# Patient Record
Sex: FEMALE | Race: OTHER | HISPANIC OR LATINO | Employment: FULL TIME | URBAN - METROPOLITAN AREA
[De-identification: names, ages, dates, MRNs, and addresses within clinical notes are randomized per-mention and may not be internally consistent; named-entity substitution may affect disease eponyms.]

---

## 2021-05-03 ENCOUNTER — OFFICE VISIT (OUTPATIENT)
Dept: MEDICAL GROUP | Facility: IMAGING CENTER | Age: 21
End: 2021-05-03
Payer: COMMERCIAL

## 2021-05-03 VITALS
DIASTOLIC BLOOD PRESSURE: 80 MMHG | WEIGHT: 135 LBS | OXYGEN SATURATION: 98 % | RESPIRATION RATE: 14 BRPM | SYSTOLIC BLOOD PRESSURE: 122 MMHG | BODY MASS INDEX: 25.49 KG/M2 | HEART RATE: 78 BPM | TEMPERATURE: 97.2 F | HEIGHT: 61 IN

## 2021-05-03 DIAGNOSIS — Z76.89 ENCOUNTER TO ESTABLISH CARE: ICD-10-CM

## 2021-05-03 DIAGNOSIS — Z3A.01 LESS THAN 8 WEEKS GESTATION OF PREGNANCY: ICD-10-CM

## 2021-05-03 PROCEDURE — 99203 OFFICE O/P NEW LOW 30 MIN: CPT | Performed by: PHYSICIAN ASSISTANT

## 2021-05-03 ASSESSMENT — PATIENT HEALTH QUESTIONNAIRE - PHQ9: CLINICAL INTERPRETATION OF PHQ2 SCORE: 0

## 2021-05-03 NOTE — PROGRESS NOTES
Subjective:     CC:    Chief Complaint   Patient presents with   • Establish Care   • Pregnancy     bleeding in the begining        HISTORY OF THE PRESENT ILLNESS: Patient is a 20 y.o. female here to establish care and discuss pregnancy..    Less than 8 weeks gestation of pregnancy  Patient presents with her boyfriend.  Patient states her last menstrual period was 3/17/2021.  She did a pregnancy test 1 month ago which was positive.  She did have intermittent episodes of scant bleeding during intercourse.  Happen around 6 times but has since resolved.  No history of pelvic pain.  She does admit to breast tenderness.  She has had 3 pregnancies in the past 2 with live births via vaginal delivery.  No  complications during that time.  She is currently on a prenatal vitamin.  Patient states that she has had the varicella vaccine.  She has new to [place] from [place] and does not have her previous medical records with her.  Patient thinks her last Tdap was 2 years ago.    Depression Screening    Little interest or pleasure in doing things?  0 - not at all  Feeling down, depressed , or hopeless? 0 - not at all  Patient Health Questionnaire Score: 0      Allergies:   Patient has no known allergies.  Current Outpatient Medications Ordered in Epic   Medication Sig Dispense Refill   • Prenatal Vit-Fe Fumarate-FA (PRENATAL MULTIVITAMIN/IRON PO) Take  by mouth.       No current Epic-ordered facility-administered medications on file.     No past medical history on file.  No past surgical history on file.  Social History     Tobacco Use   • Smoking status: Never Smoker   • Smokeless tobacco: Never Used   Substance Use Topics   • Alcohol use: Not Currently   • Drug use: Never     Social History     Social History Narrative   • Not on file     Family History   Problem Relation Age of Onset   • Cancer Neg Hx    • Heart Disease Neg Hx    • Diabetes Neg Hx        Ob-Gyn/ History:    Patient has GYN provider: no, previously  "in [place]  /Para: 3/2 - no history of gestational diabetes, no pre-eclampsia  No LMP recorded. Patient is pregnant.  Last pap smear: over 3 years ago, has had two negative pap smears  History of abnormal pap smears: No  Current Contraceptive Method: None  Currently sexually active: Yes with boyfriend  H/O STD: No  Periods regular  Cramping is minimal     ROS:   Gen: no fevers/chills, no changes in weight  Eyes: no changes in vision  ENT: no sore throat, no hearing loss, no bloody nose  Pulm: no sob, no cough  CV: no chest pain, no palpitations  GI: no nausea/vomiting, no diarrhea  : no dysuria or hematuria  MSk: no myalgias  Skin: no rash  Neuro: no headaches, no numbness/tingling  Heme/Lymph: no easy bruising      Objective:     Exam: /80 (BP Location: Left arm, Patient Position: Sitting, BP Cuff Size: Adult)   Pulse 78   Temp 36.2 °C (97.2 °F) (Temporal)   Resp 14   Ht 1.549 m (5' 1\")   Wt 61.2 kg (135 lb)   SpO2 98%  Body mass index is 25.51 kg/m².  General: Normal appearing. No distress.  HEENT: Normocephalic. Eyes conjunctiva clear lids without ptosis, pupils equal   Neck: Supple without JVD or bruit. Thyroid is not enlarged.  Pulmonary: Clear to ausculation.  Normal effort. No rales, ronchi, or wheezing.  Cardiovascular: Regular rate and rhythm without murmur. Carotid and radial pulses are intact and equal bilaterally.  Abdomen: Soft, nontender, nondistended. Normal bowel sounds. Liver and spleen are not palpable  Neurologic: Grossly nonfocal  Skin: Warm and dry.  No obvious lesions.  Musculoskeletal: Normal gait. No extremity cyanosis, clubbing, or edema.  Psych: Normal mood and affect. Alert and oriented x3. Judgment and insight is normal.    Assessment & Plan:   20 y.o. female with the following -    1. Encounter to establish care  Pleasant 20-year-old female here to establish care and discuss pregnancy.  Patient admits to history of varicella vaccine as well as recent Tdap.    2. " Less than 8 weeks gestation of pregnancy  Last menstrual period 3/17/2021.  Refer to ob/gyn for further management.  See lab orders below. RTC if bleeding recurs. Pap with gyn.  - US-OB 1ST TRIMESTER SINGLE GEST; Future  - REFERRAL TO OB/GYN  - PREG CNTR PRENATAL PN; Future  - HEP C VIRUS ANTIBODY; Future  - TSH WITH REFLEX TO FT4; Future  - HCG QUANTITATIVE; Future    Return if symptoms worsen or fail to improve.    Aurora Russell PA-C ([place])  Physician Assistant Certified  [facility]    Please note that this dictation was created using voice recognition software. I have made every reasonable attempt to correct obvious errors, but I expect that there are errors of grammar and possibly content that I did not discover before finalizing the note.

## 2021-05-03 NOTE — ASSESSMENT & PLAN NOTE
Patient presents with her boyfriend.  Patient states her last menstrual period was 3/17/2021.  She did a pregnancy test 1 month ago which was positive.  She did have intermittent episodes of scant bleeding during intercourse.  Happen around 6 times but has since resolved.  No history of pelvic pain.  She does admit to breast tenderness.  She has had 3 pregnancies in the past 2 with live births via vaginal delivery.  No  complications during that time.  She is currently on a prenatal vitamin.  Patient states that she has had the varicella vaccine.  She has new to [place] from [place] and does not have her previous medical records with her.  Patient thinks her last Tdap was 2 years ago.

## 2021-05-05 LAB
HIV 1+2 AB+HIV1 P24 AG SERPL QL IA: NON REACTIVE
TREPONEMA PALLIDUM IGG+IGM AB [PRESENCE] IN SERUM OR PLASMA BY IMMUNOASSAY: NON REACTIVE

## 2021-05-06 LAB
ABO GROUP BLD: NORMAL
BASOPHILS # BLD AUTO: 0.1 X10E3/UL (ref 0–0.2)
BASOPHILS NFR BLD AUTO: 1 %
BLD GP AB SCN SERPL QL: NEGATIVE
EOSINOPHIL # BLD AUTO: 0.1 X10E3/UL (ref 0–0.4)
EOSINOPHIL NFR BLD AUTO: 1 %
ERYTHROCYTE [DISTWIDTH] IN BLOOD BY AUTOMATED COUNT: 12 % (ref 11.7–15.4)
HBV SURFACE AG SERPL QL IA: NEGATIVE
HCG INTACT+B SERPL-ACNC: NORMAL MIU/ML
HCT VFR BLD AUTO: 41.4 % (ref 34–46.6)
HCV AB S/CO SERPL IA: 0.2 S/CO RATIO (ref 0–0.9)
HGB BLD-MCNC: 13.9 G/DL (ref 11.1–15.9)
HIV 1+2 AB+HIV1 P24 AG SERPL QL IA: NON REACTIVE
IMM GRANULOCYTES # BLD AUTO: 0 X10E3/UL (ref 0–0.1)
IMM GRANULOCYTES NFR BLD AUTO: 1 %
IMMATURE CELLS  115398: NORMAL
LYMPHOCYTES # BLD AUTO: 1.9 X10E3/UL (ref 0.7–3.1)
LYMPHOCYTES NFR BLD AUTO: 30 %
MCH RBC QN AUTO: 30.7 PG (ref 26.6–33)
MCHC RBC AUTO-ENTMCNC: 33.6 G/DL (ref 31.5–35.7)
MCV RBC AUTO: 91 FL (ref 79–97)
MONOCYTES # BLD AUTO: 0.6 X10E3/UL (ref 0.1–0.9)
MONOCYTES NFR BLD AUTO: 9 %
MORPHOLOGY BLD-IMP: NORMAL
NEUTROPHILS # BLD AUTO: 3.6 X10E3/UL (ref 1.4–7)
NEUTROPHILS NFR BLD AUTO: 58 %
NRBC BLD AUTO-RTO: NORMAL %
PLATELET # BLD AUTO: 258 X10E3/UL (ref 150–450)
RBC # BLD AUTO: 4.53 X10E6/UL (ref 3.77–5.28)
RH BLD: POSITIVE
RPR SER QL: NON REACTIVE
RUBV IGG SERPL IA-ACNC: 1.31 INDEX
TSH SERPL DL<=0.005 MIU/L-ACNC: 1.35 UIU/ML (ref 0.45–4.5)
WBC # BLD AUTO: 6.2 X10E3/UL (ref 3.4–10.8)

## 2021-05-20 ENCOUNTER — HOSPITAL ENCOUNTER (OUTPATIENT)
Dept: RADIOLOGY | Facility: MEDICAL CENTER | Age: 21
End: 2021-05-20
Attending: PHYSICIAN ASSISTANT
Payer: COMMERCIAL

## 2021-05-20 DIAGNOSIS — Z3A.01 LESS THAN 8 WEEKS GESTATION OF PREGNANCY: ICD-10-CM

## 2021-05-20 PROCEDURE — 76817 TRANSVAGINAL US OBSTETRIC: CPT

## 2021-06-07 ENCOUNTER — OFFICE VISIT (OUTPATIENT)
Dept: URGENT CARE | Facility: PHYSICIAN GROUP | Age: 21
End: 2021-06-07
Payer: COMMERCIAL

## 2021-06-07 VITALS
HEIGHT: 62 IN | BODY MASS INDEX: 25.76 KG/M2 | DIASTOLIC BLOOD PRESSURE: 58 MMHG | SYSTOLIC BLOOD PRESSURE: 102 MMHG | WEIGHT: 140 LBS | TEMPERATURE: 98.6 F | RESPIRATION RATE: 16 BRPM | OXYGEN SATURATION: 99 % | HEART RATE: 86 BPM

## 2021-06-07 DIAGNOSIS — R11.0 NAUSEA: ICD-10-CM

## 2021-06-07 DIAGNOSIS — M54.50 ACUTE RIGHT-SIDED LOW BACK PAIN WITHOUT SCIATICA: ICD-10-CM

## 2021-06-07 DIAGNOSIS — Z87.59 HISTORY OF MISCARRIAGE: ICD-10-CM

## 2021-06-07 DIAGNOSIS — R82.90 ABNORMAL URINALYSIS: ICD-10-CM

## 2021-06-07 DIAGNOSIS — Z3A.12 12 WEEKS GESTATION OF PREGNANCY: ICD-10-CM

## 2021-06-07 PROCEDURE — 99214 OFFICE O/P EST MOD 30 MIN: CPT | Performed by: FAMILY MEDICINE

## 2021-06-07 RX ORDER — CEFDINIR 300 MG/1
300 CAPSULE ORAL 2 TIMES DAILY
Qty: 10 CAPSULE | Refills: 0 | Status: SHIPPED | OUTPATIENT
Start: 2021-06-07 | End: 2021-06-12

## 2021-06-07 RX ORDER — DOXYLAMINE SUCCINATE AND PYRIDOXINE HYDROCHLORIDE, DELAYED RELEASE TABLETS 10 MG/10 MG 10; 10 MG/1; MG/1
TABLET, DELAYED RELEASE ORAL
Qty: 60 TABLET | Refills: 0 | Status: SHIPPED | OUTPATIENT
Start: 2021-06-07 | End: 2021-07-30

## 2021-06-07 ASSESSMENT — ENCOUNTER SYMPTOMS
EYE DISCHARGE: 0
MYALGIAS: 0
WEIGHT LOSS: 0
EYE REDNESS: 0

## 2021-06-07 ASSESSMENT — PAIN SCALES - GENERAL: PAINLEVEL: 4=SLIGHT-MODERATE PAIN

## 2021-06-07 NOTE — LETTER
June 7, 2021         Patient: Jan Montes De Oca   YOB: 2000   Date of Visit: 6/7/2021           To Whom it May Concern:    Jan Montes De Oca was seen in my clinic on 6/7/2021 with Alysha Hernandez. Please excuse Cody 6/6 and 6/7/2021.    Sincerely,           Keshav Simmons M.D.  Electronically Signed

## 2021-06-07 NOTE — PROGRESS NOTES
"Subjective:      Jan Montes De Oca is a 21 y.o. female who presents with Back Pain (Lower back pain pt is 3 months pregnant, about 5 months ago pt had a miscarriage)            1 day right low back pain.  4 out of 10 severity.  Not positional.  She is 12 weeks pregnant and pain feels the same as when she had a previous miscarriage.  No vaginal bleeding or loss of fluid.  She has been nauseated with this pregnancy.  She notes a previous ultrasound that showed intrauterine and no ectopic.  No dysuria.  No hematuria.  No fever.  No other aggravating or alleviating factors.      Review of Systems   Constitutional: Negative for malaise/fatigue and weight loss.   Eyes: Negative for discharge and redness.   Musculoskeletal: Negative for joint pain and myalgias.   Skin: Negative for itching and rash.          Objective:     /58   Pulse 86   Temp 37 °C (98.6 °F) (Temporal)   Resp 16   Ht 1.575 m (5' 2\")   Wt 63.5 kg (140 lb)   SpO2 99%   BMI 25.61 kg/m²      Physical Exam  Constitutional:       General: She is not in acute distress.     Appearance: She is well-developed.   HENT:      Head: Normocephalic and atraumatic.   Eyes:      Conjunctiva/sclera: Conjunctivae normal.   Cardiovascular:      Rate and Rhythm: Normal rate and regular rhythm.      Heart sounds: Normal heart sounds. No murmur heard.     Pulmonary:      Effort: Pulmonary effort is normal.      Breath sounds: Normal breath sounds. No wheezing.   Abdominal:      Palpations: Abdomen is soft.      Comments: No suprapubic tenderness   Musculoskeletal:        Back:    Skin:     General: Skin is warm and dry.      Findings: No rash.   Neurological:      Mental Status: She is alert and oriented to person, place, and time.                        Assessment/Plan:   UA reviewed +nitrite    1. Acute right-sided low back pain without sciatica  US-OB TRANSVAGINAL ONLY    cefdinir (OMNICEF) 300 MG Cap    URINE CULTURE(NEW)   2. 12 weeks gestation of pregnancy  US-OB " TRANSVAGINAL ONLY    cefdinir (OMNICEF) 300 MG Cap    URINE CULTURE(NEW)   3. History of miscarriage  US-OB TRANSVAGINAL ONLY   4. Abnormal urinalysis  cefdinir (OMNICEF) 300 MG Cap    URINE CULTURE(NEW)   5. Nausea  Doxylamine-Pyridoxine 10-10 MG Tablet Delayed Response delayed-release tablet     Differential diagnosis, natural history, supportive care, and indications for immediate follow-up discussed at length.     We are unable to get ultrasound due to [employer] insurance.  She will seek ultrasound to [facility] or go to the emergency department.  I will initiate antibiotic and follow-up with urine culture.

## 2021-06-10 LAB
BACTERIA UR CULT: ABNORMAL
BACTERIA UR CULT: ABNORMAL
OTHER ANTIBIOTIC SUSC ISLT: ABNORMAL

## 2021-07-30 ENCOUNTER — APPOINTMENT (OUTPATIENT)
Dept: OBGYN | Facility: CLINIC | Age: 21
End: 2021-07-30
Payer: COMMERCIAL

## 2021-07-30 ENCOUNTER — HOSPITAL ENCOUNTER (OUTPATIENT)
Facility: MEDICAL CENTER | Age: 21
End: 2021-07-30
Attending: NURSE PRACTITIONER
Payer: COMMERCIAL

## 2021-07-30 ENCOUNTER — INITIAL PRENATAL (OUTPATIENT)
Dept: OBGYN | Facility: CLINIC | Age: 21
End: 2021-07-30
Payer: COMMERCIAL

## 2021-07-30 VITALS
DIASTOLIC BLOOD PRESSURE: 60 MMHG | SYSTOLIC BLOOD PRESSURE: 110 MMHG | BODY MASS INDEX: 25.41 KG/M2 | HEIGHT: 62 IN | WEIGHT: 138.1 LBS

## 2021-07-30 DIAGNOSIS — O23.42 URINARY TRACT INFECTION IN MOTHER DURING SECOND TRIMESTER OF PREGNANCY: ICD-10-CM

## 2021-07-30 DIAGNOSIS — Z34.82 ENCOUNTER FOR SUPERVISION OF OTHER NORMAL PREGNANCY, SECOND TRIMESTER: Primary | ICD-10-CM

## 2021-07-30 DIAGNOSIS — Z34.82 ENCOUNTER FOR SUPERVISION OF OTHER NORMAL PREGNANCY, SECOND TRIMESTER: ICD-10-CM

## 2021-07-30 PROBLEM — Z3A.01 LESS THAN 8 WEEKS GESTATION OF PREGNANCY: Status: RESOLVED | Noted: 2021-05-03 | Resolved: 2021-07-30

## 2021-07-30 LAB
APPEARANCE UR: CLEAR
BILIRUB UR STRIP-MCNC: NORMAL MG/DL
COLOR UR AUTO: YELLOW
GLUCOSE UR STRIP.AUTO-MCNC: NEGATIVE MG/DL
KETONES UR STRIP.AUTO-MCNC: 40 MG/DL
LEUKOCYTE ESTERASE UR QL STRIP.AUTO: NORMAL
NITRITE UR QL STRIP.AUTO: POSITIVE
PH UR STRIP.AUTO: 7 [PH] (ref 5–8)
PROT UR QL STRIP: NEGATIVE MG/DL
RBC UR QL AUTO: NEGATIVE
SP GR UR STRIP.AUTO: 1.01
UROBILINOGEN UR STRIP-MCNC: NORMAL MG/DL

## 2021-07-30 PROCEDURE — 87591 N.GONORRHOEAE DNA AMP PROB: CPT

## 2021-07-30 PROCEDURE — 81002 URINALYSIS NONAUTO W/O SCOPE: CPT | Performed by: NURSE PRACTITIONER

## 2021-07-30 PROCEDURE — 88175 CYTOPATH C/V AUTO FLUID REDO: CPT

## 2021-07-30 PROCEDURE — 87624 HPV HI-RISK TYP POOLED RSLT: CPT

## 2021-07-30 PROCEDURE — 0500F INITIAL PRENATAL CARE VISIT: CPT | Performed by: NURSE PRACTITIONER

## 2021-07-30 PROCEDURE — 87491 CHLMYD TRACH DNA AMP PROBE: CPT

## 2021-07-30 RX ORDER — NITROFURANTOIN 25; 75 MG/1; MG/1
100 CAPSULE ORAL 2 TIMES DAILY
Qty: 14 CAPSULE | Refills: 0 | Status: SHIPPED | OUTPATIENT
Start: 2021-07-30 | End: 2021-08-06

## 2021-07-30 NOTE — PROGRESS NOTES
CC: New Ob visit     Subjective Ms. Jan Montes De Oca  19w0d by 8w6d US presents for prenatal care. Today is her first prenatal visit at [facility] She denies any contractions/cramping, leakage of fluid, vaginal bleeding. She does feel movement. Pregnancy is unplanned.  Pt is single.  Lives with boyfirend and children.  Presently working in a warehouse.     I have reviewed the patients' medical, surgical, gynecological, obstetrical, social, and family histories, medications and available lab results and pertinent notes are as follows:     OB History    Para Term  AB Living   3 2 2 0 0 2   SAB TAB Ectopic Molar Multiple Live Births   0 0 0 0 0 2       Past Gynecological History:  Denies fibroids, ovarian cysts, abnormal pap smears, STDs. She denies ever having surgery on her cervix, uterus, or ovaries in the past.  Last pap smear was NA    History reviewed. No pertinent past medical history.  Past Surgical History:   Procedure Laterality Date   • CHOLECYSTECTOMY        Social History     Socioeconomic History   • Marital status: Single     Spouse name: Not on file   • Number of children: Not on file   • Years of education: Not on file   • Highest education level: Not on file   Occupational History   • Not on file   Tobacco Use   • Smoking status: Never Smoker   • Smokeless tobacco: Never Used   Vaping Use   • Vaping Use: Never used   Substance and Sexual Activity   • Alcohol use: Not Currently   • Drug use: Never   • Sexual activity: Yes     Partners: Male   Other Topics Concern   • Not on file   Social History Narrative   • Not on file     Social Determinants of Health     Financial Resource Strain:    • Difficulty of Paying Living Expenses:    Food Insecurity:    • Worried About Running Out of Food in the Last Year:    • Ran Out of Food in the Last Year:    Transportation Needs:    • Lack of Transportation (Medical):    • Lack of Transportation (Non-Medical):    Physical Activity:     • Days of Exercise per Week:    • Minutes of Exercise per Session:    Stress:    • Feeling of Stress :    Social Connections:    • Frequency of Communication with Friends and Family:    • Frequency of Social Gatherings with Friends and Family:    • Attends Sikh Services:    • Active Member of Clubs or Organizations:    • Attends Club or Organization Meetings:    • Marital Status:    Intimate Partner Violence:    • Fear of Current or Ex-Partner:    • Emotionally Abused:    • Physically Abused:    • Sexually Abused:      Family History:   Family History   Problem Relation Age of Onset   • Cancer Neg Hx    • Heart Disease Neg Hx    • Diabetes Neg Hx        Genetic Screening/Teratology Counseling- Includes patient, baby's father, or anyone in either family with:  Patient's age 35 years or older as of estimated date of delivery: No   Thalassemia (Italian, Greek, Mediterranean, or  background): MCV less than 80: No   Neural tube defect (Meningomyelocele, Spina bifida, or Anencephaly): No   Congenital heart defect: No   Down syndrome: No   Brett-Sachs (Ashkenazi Voodoo, Cajun, Uzbek Sammarinese): No   Canavan disease (Ashkenazi Voodoo): No   Familial dysautonomia (Ashkenazi Voodoo): No   Sickle cell disease or trait (): No   Hemophilia or other blood disorders: No   Muscular dystrophy: No    Cystic fibrosis: No   Miranda's chorea: No   Mental retardation/autism: No   Other inherited genetic or chromosomal disorder: No   Maternal metabolic disorder (eg. Type 1 diabetes, PKU): No   Patient or baby's father had child with birth defects not listed above: No   Recurrent pregnancy loss, or a stillbirth: No   Medications (including supplements, vitamins, herbs, or OTC drugs)/illicit/recreational drugs/alcohol since last menstrual period: No           Infection Prevention  1. High Risk For HIV: No 6. Rash Or Illness Since LMP: No   2. High Risk For Hepatitis B or C: No 7. History Of STD, GC, Chlamydia, HPV  "Syphilis: No   3. Live With Someone With TB Or Exposed To TB: No 8. Have a cat in the home?: No   4. Patient Or Partner Has A History Of Herpes: No    5. History of Chicken Pox: No    Comments: Unplanned but desired. FOB involved and supportive. Different FOB. 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          Allergies: Patient has no known allergies.  Objective:/60   Ht 1.575 m (5' 2\")   Wt 62.6 kg (138 lb 1.6 oz)      Objective:   Vitals:    07/30/21 0829   BP: 110/60     Prenatal Physical:  General Exam:  HEENT: normal    Heart: normal    Thyroid: normal    Lungs: normal    Lymph Nodes: normal    Breasts: normal    Neurological: normal    Abdomen: normal    Skin: normal    Extremities: normal    Pelvic Exam:  Vulva:  Normal  Vagina:  Normal  Cervix:  Normal  Uterus (wks):  19  Adnexa:  Normal  Rectum:  Not assessed       Lab:   Recent Results (from the past 672 hour(s))   POCT Urinalysis    Collection Time: 07/30/21  8:48 AM   Result Value Ref Range    POC Color Yellow Negative    POC Appearance Clear Negative    POC Leukocyte Esterase Trace Negative    POC Nitrites Positive Negative    POC Urobiligen      POC Protein Negative Negative mg/dL    POC Urine PH 7.0 5.0 - 8.0    POC Blood Negative Negative    POC Specific Gravity 1.015 <1.005 - >1.030    POC Ketones 40 Negative mg/dL    POC Bilirubin      POC Glucose Negative Negative mg/dL       Ultrasound:  Reviewed initial scan and SAIDA is by 8w6d US    Assessment:  --Normal Exam at 19 weeks  Patient Active Problem List   Diagnosis   • Encounter for supervision of other normal pregnancy, second trimester   • Urinary tract infection in mother during second trimester of pregnancy         Plan:  Genetic screening was discussed with literature on Prenatal Screening, Cystic Fibrosis, and SMA provided and the patient declines all testing at this time.  Discuss importance of water intake, healthy diet, eating 5 small meals throughout the day to " maintain blood sugar and taking PNV  If has nausea, take Vitamin B6 25mg TID with doxylamine/Unisom 25mg at night  Discuss importance of exercise, as well as rest.  Pap/GCCT done.  Labs ordered.  Complete OB US 4 wks.  Discuss policy for OB care at [facility]  Follow up in 4 weeks for next visit     Chaperone offered and provided by Malissa Keith MA.

## 2021-07-30 NOTE — PROGRESS NOTES
NOB today  LMP: N/a  Last pap: Today   Phone # [phone]  Pharmacy confirmed  On PNV Yes  Cystic Fibrosis test offered. Declined   C/o   Nausea

## 2021-08-03 LAB
C TRACH DNA GENITAL QL NAA+PROBE: NEGATIVE
CYTOLOGY REG CYTOL: NORMAL
N GONORRHOEA DNA GENITAL QL NAA+PROBE: NEGATIVE
SPECIMEN SOURCE: NORMAL

## 2021-08-05 LAB
HPV HR 12 DNA CVX QL NAA+PROBE: POSITIVE
HPV16 DNA SPEC QL NAA+PROBE: NEGATIVE
HPV18 DNA SPEC QL NAA+PROBE: NEGATIVE
SPECIMEN SOURCE: ABNORMAL

## 2021-08-06 DIAGNOSIS — R87.610 ASCUS WITH POSITIVE HIGH RISK HPV CERVICAL: ICD-10-CM

## 2021-08-06 DIAGNOSIS — R87.810 ASCUS WITH POSITIVE HIGH RISK HPV CERVICAL: ICD-10-CM

## 2021-08-06 DIAGNOSIS — B96.89 BV (BACTERIAL VAGINOSIS): Primary | ICD-10-CM

## 2021-08-06 DIAGNOSIS — N76.0 BV (BACTERIAL VAGINOSIS): Primary | ICD-10-CM

## 2021-08-06 RX ORDER — METRONIDAZOLE 500 MG/1
500 TABLET ORAL EVERY 12 HOURS
Qty: 14 TABLET | Refills: 0 | Status: SHIPPED | OUTPATIENT
Start: 2021-08-06 | End: 2021-08-13

## 2021-08-18 ENCOUNTER — APPOINTMENT (OUTPATIENT)
Dept: RADIOLOGY | Facility: IMAGING CENTER | Age: 21
End: 2021-08-18
Attending: NURSE PRACTITIONER
Payer: COMMERCIAL

## 2021-08-18 DIAGNOSIS — Z34.82 ENCOUNTER FOR SUPERVISION OF OTHER NORMAL PREGNANCY, SECOND TRIMESTER: ICD-10-CM

## 2021-08-18 PROCEDURE — 76805 OB US >/= 14 WKS SNGL FETUS: CPT | Mod: TC | Performed by: NURSE PRACTITIONER

## 2021-08-23 ENCOUNTER — APPOINTMENT (OUTPATIENT)
Dept: RADIOLOGY | Facility: MEDICAL CENTER | Age: 21
DRG: 833 | End: 2021-08-23
Attending: NURSE PRACTITIONER
Payer: COMMERCIAL

## 2021-08-23 ENCOUNTER — HOSPITAL ENCOUNTER (INPATIENT)
Facility: MEDICAL CENTER | Age: 21
LOS: 1 days | DRG: 833 | End: 2021-08-26
Attending: OBSTETRICS & GYNECOLOGY | Admitting: OBSTETRICS & GYNECOLOGY
Payer: COMMERCIAL

## 2021-08-23 LAB
ALBUMIN SERPL BCP-MCNC: 3.2 G/DL (ref 3.2–4.9)
ALBUMIN SERPL BCP-MCNC: 3.5 G/DL (ref 3.2–4.9)
ALBUMIN/GLOB SERPL: 1.3 G/DL
ALBUMIN/GLOB SERPL: 1.4 G/DL
ALP SERPL-CCNC: 78 U/L (ref 30–99)
ALP SERPL-CCNC: 82 U/L (ref 30–99)
ALT SERPL-CCNC: 5 U/L (ref 2–50)
ALT SERPL-CCNC: 6 U/L (ref 2–50)
ANION GAP SERPL CALC-SCNC: 13 MMOL/L (ref 7–16)
ANION GAP SERPL CALC-SCNC: 13 MMOL/L (ref 7–16)
APPEARANCE UR: ABNORMAL
APPEARANCE UR: CLEAR
AST SERPL-CCNC: 14 U/L (ref 12–45)
AST SERPL-CCNC: 15 U/L (ref 12–45)
BACTERIA #/AREA URNS HPF: ABNORMAL /HPF
BASOPHILS # BLD AUTO: 0.1 % (ref 0–1.8)
BASOPHILS # BLD AUTO: 0.2 % (ref 0–1.8)
BASOPHILS # BLD AUTO: 0.2 % (ref 0–1.8)
BASOPHILS # BLD: 0.01 K/UL (ref 0–0.12)
BASOPHILS # BLD: 0.02 K/UL (ref 0–0.12)
BASOPHILS # BLD: 0.03 K/UL (ref 0–0.12)
BILIRUB SERPL-MCNC: 0.4 MG/DL (ref 0.1–1.5)
BILIRUB SERPL-MCNC: 0.5 MG/DL (ref 0.1–1.5)
BILIRUB UR QL STRIP.AUTO: NEGATIVE
BUN SERPL-MCNC: 4 MG/DL (ref 8–22)
BUN SERPL-MCNC: 5 MG/DL (ref 8–22)
CALCIUM SERPL-MCNC: 8.4 MG/DL (ref 8.5–10.5)
CALCIUM SERPL-MCNC: 8.5 MG/DL (ref 8.5–10.5)
CHLORIDE SERPL-SCNC: 104 MMOL/L (ref 96–112)
CHLORIDE SERPL-SCNC: 106 MMOL/L (ref 96–112)
CO2 SERPL-SCNC: 17 MMOL/L (ref 20–33)
CO2 SERPL-SCNC: 20 MMOL/L (ref 20–33)
COLOR UR AUTO: ABNORMAL
COLOR UR: YELLOW
CREAT SERPL-MCNC: 0.28 MG/DL (ref 0.5–1.4)
CREAT SERPL-MCNC: 0.31 MG/DL (ref 0.5–1.4)
EOSINOPHIL # BLD AUTO: 0 K/UL (ref 0–0.51)
EOSINOPHIL # BLD AUTO: 0.01 K/UL (ref 0–0.51)
EOSINOPHIL # BLD AUTO: 0.01 K/UL (ref 0–0.51)
EOSINOPHIL NFR BLD: 0 % (ref 0–6.9)
EOSINOPHIL NFR BLD: 0.1 % (ref 0–6.9)
EOSINOPHIL NFR BLD: 0.1 % (ref 0–6.9)
EPI CELLS #/AREA URNS HPF: NEGATIVE /HPF
ERYTHROCYTE [DISTWIDTH] IN BLOOD BY AUTOMATED COUNT: 43.2 FL (ref 35.9–50)
ERYTHROCYTE [DISTWIDTH] IN BLOOD BY AUTOMATED COUNT: 43.2 FL (ref 35.9–50)
ERYTHROCYTE [DISTWIDTH] IN BLOOD BY AUTOMATED COUNT: 44.1 FL (ref 35.9–50)
GLOBULIN SER CALC-MCNC: 2.3 G/DL (ref 1.9–3.5)
GLOBULIN SER CALC-MCNC: 2.7 G/DL (ref 1.9–3.5)
GLUCOSE SERPL-MCNC: 100 MG/DL (ref 65–99)
GLUCOSE SERPL-MCNC: 90 MG/DL (ref 65–99)
GLUCOSE UR QL STRIP.AUTO: NEGATIVE MG/DL
GLUCOSE UR STRIP.AUTO-MCNC: NEGATIVE MG/DL
HCT VFR BLD AUTO: 31.1 % (ref 37–47)
HCT VFR BLD AUTO: 31.7 % (ref 37–47)
HCT VFR BLD AUTO: 34.8 % (ref 37–47)
HGB BLD-MCNC: 10.7 G/DL (ref 12–16)
HGB BLD-MCNC: 10.8 G/DL (ref 12–16)
HGB BLD-MCNC: 12.2 G/DL (ref 12–16)
HYALINE CASTS #/AREA URNS LPF: ABNORMAL /LPF
IMM GRANULOCYTES # BLD AUTO: 0.07 K/UL (ref 0–0.11)
IMM GRANULOCYTES # BLD AUTO: 0.09 K/UL (ref 0–0.11)
IMM GRANULOCYTES # BLD AUTO: 0.12 K/UL (ref 0–0.11)
IMM GRANULOCYTES NFR BLD AUTO: 0.7 % (ref 0–0.9)
IMM GRANULOCYTES NFR BLD AUTO: 0.7 % (ref 0–0.9)
IMM GRANULOCYTES NFR BLD AUTO: 0.8 % (ref 0–0.9)
KETONES UR QL STRIP.AUTO: ABNORMAL MG/DL
KETONES UR STRIP.AUTO-MCNC: 80 MG/DL
LACTATE BLD-SCNC: 1.1 MMOL/L (ref 0.5–2)
LACTATE BLD-SCNC: 1.3 MMOL/L (ref 0.5–2)
LEUKOCYTE ESTERASE UR QL STRIP.AUTO: ABNORMAL
LEUKOCYTE ESTERASE UR QL STRIP.AUTO: ABNORMAL
LYMPHOCYTES # BLD AUTO: 0.65 K/UL (ref 1–4.8)
LYMPHOCYTES # BLD AUTO: 0.7 K/UL (ref 1–4.8)
LYMPHOCYTES # BLD AUTO: 0.91 K/UL (ref 1–4.8)
LYMPHOCYTES NFR BLD: 5.2 % (ref 22–41)
LYMPHOCYTES NFR BLD: 5.7 % (ref 22–41)
LYMPHOCYTES NFR BLD: 7.3 % (ref 22–41)
MCH RBC QN AUTO: 30.8 PG (ref 27–33)
MCH RBC QN AUTO: 30.9 PG (ref 27–33)
MCH RBC QN AUTO: 31.4 PG (ref 27–33)
MCHC RBC AUTO-ENTMCNC: 34.1 G/DL (ref 33.6–35)
MCHC RBC AUTO-ENTMCNC: 34.4 G/DL (ref 33.6–35)
MCHC RBC AUTO-ENTMCNC: 35.1 G/DL (ref 33.6–35)
MCV RBC AUTO: 89.6 FL (ref 81.4–97.8)
MCV RBC AUTO: 89.7 FL (ref 81.4–97.8)
MCV RBC AUTO: 90.8 FL (ref 81.4–97.8)
MICRO URNS: ABNORMAL
MONOCYTES # BLD AUTO: 0.43 K/UL (ref 0–0.85)
MONOCYTES # BLD AUTO: 0.46 K/UL (ref 0–0.85)
MONOCYTES # BLD AUTO: 0.73 K/UL (ref 0–0.85)
MONOCYTES NFR BLD AUTO: 3.7 % (ref 0–13.4)
MONOCYTES NFR BLD AUTO: 4.5 % (ref 0–13.4)
MONOCYTES NFR BLD AUTO: 4.6 % (ref 0–13.4)
NEUTROPHILS # BLD AUTO: 11.3 K/UL (ref 2–7.15)
NEUTROPHILS # BLD AUTO: 14.13 K/UL (ref 2–7.15)
NEUTROPHILS # BLD AUTO: 8.36 K/UL (ref 2–7.15)
NEUTROPHILS NFR BLD: 87.2 % (ref 44–72)
NEUTROPHILS NFR BLD: 88.6 % (ref 44–72)
NEUTROPHILS NFR BLD: 90.3 % (ref 44–72)
NITRITE UR QL STRIP.AUTO: NEGATIVE
NITRITE UR QL STRIP.AUTO: NEGATIVE
NRBC # BLD AUTO: 0 K/UL
NRBC BLD-RTO: 0 /100 WBC
PH UR STRIP.AUTO: 7 [PH] (ref 5–8)
PH UR STRIP.AUTO: 7 [PH] (ref 5–8)
PLATELET # BLD AUTO: 171 K/UL (ref 164–446)
PLATELET # BLD AUTO: 182 K/UL (ref 164–446)
PLATELET # BLD AUTO: 187 K/UL (ref 164–446)
PMV BLD AUTO: 10.7 FL (ref 9–12.9)
PMV BLD AUTO: 10.8 FL (ref 9–12.9)
PMV BLD AUTO: 11 FL (ref 9–12.9)
POTASSIUM SERPL-SCNC: 3.2 MMOL/L (ref 3.6–5.5)
POTASSIUM SERPL-SCNC: 3.4 MMOL/L (ref 3.6–5.5)
PROT SERPL-MCNC: 5.5 G/DL (ref 6–8.2)
PROT SERPL-MCNC: 6.2 G/DL (ref 6–8.2)
PROT UR QL STRIP: NEGATIVE MG/DL
PROT UR QL STRIP: NEGATIVE MG/DL
RBC # BLD AUTO: 3.47 M/UL (ref 4.2–5.4)
RBC # BLD AUTO: 3.49 M/UL (ref 4.2–5.4)
RBC # BLD AUTO: 3.88 M/UL (ref 4.2–5.4)
RBC # URNS HPF: ABNORMAL /HPF
RBC UR QL AUTO: NEGATIVE
RBC UR QL AUTO: NEGATIVE
SARS-COV+SARS-COV-2 AG RESP QL IA.RAPID: NOTDETECTED
SARS-COV-2 RNA RESP QL NAA+PROBE: NOTDETECTED
SODIUM SERPL-SCNC: 136 MMOL/L (ref 135–145)
SODIUM SERPL-SCNC: 137 MMOL/L (ref 135–145)
SP GR UR STRIP.AUTO: 1.02
SP GR UR STRIP.AUTO: 1.02 (ref 1–1.03)
SPECIMEN SOURCE: NORMAL
SPECIMEN SOURCE: NORMAL
UROBILINOGEN UR STRIP.AUTO-MCNC: 1 MG/DL
WBC # BLD AUTO: 12.5 K/UL (ref 4.8–10.8)
WBC # BLD AUTO: 15.9 K/UL (ref 4.8–10.8)
WBC # BLD AUTO: 9.6 K/UL (ref 4.8–10.8)
WBC #/AREA URNS HPF: ABNORMAL /HPF

## 2021-08-23 PROCEDURE — U0003 INFECTIOUS AGENT DETECTION BY NUCLEIC ACID (DNA OR RNA); SEVERE ACUTE RESPIRATORY SYNDROME CORONAVIRUS 2 (SARS-COV-2) (CORONAVIRUS DISEASE [COVID-19]), AMPLIFIED PROBE TECHNIQUE, MAKING USE OF HIGH THROUGHPUT TECHNOLOGIES AS DESCRIBED BY CMS-2020-01-R: HCPCS

## 2021-08-23 PROCEDURE — 36415 COLL VENOUS BLD VENIPUNCTURE: CPT

## 2021-08-23 PROCEDURE — 76775 US EXAM ABDO BACK WALL LIM: CPT

## 2021-08-23 PROCEDURE — 99218 PR INITIAL OBSERVATION CARE,LEVL I: CPT | Performed by: OBSTETRICS & GYNECOLOGY

## 2021-08-23 PROCEDURE — 302154 KIT COOLING VEST BARIATRIC BLANKET: Performed by: OBSTETRICS & GYNECOLOGY

## 2021-08-23 PROCEDURE — 87086 URINE CULTURE/COLONY COUNT: CPT

## 2021-08-23 PROCEDURE — 87426 SARSCOV CORONAVIRUS AG IA: CPT

## 2021-08-23 PROCEDURE — 302449 STATCHG TRIAGE ONLY (STATISTIC)

## 2021-08-23 PROCEDURE — 302131 K PAD MOTOR: Performed by: OBSTETRICS & GYNECOLOGY

## 2021-08-23 PROCEDURE — A9270 NON-COVERED ITEM OR SERVICE: HCPCS | Performed by: OBSTETRICS & GYNECOLOGY

## 2021-08-23 PROCEDURE — 700102 HCHG RX REV CODE 250 W/ 637 OVERRIDE(OP): Performed by: NURSE PRACTITIONER

## 2021-08-23 PROCEDURE — 85025 COMPLETE CBC W/AUTO DIFF WBC: CPT

## 2021-08-23 PROCEDURE — U0005 INFEC AGEN DETEC AMPLI PROBE: HCPCS

## 2021-08-23 PROCEDURE — 81002 URINALYSIS NONAUTO W/O SCOPE: CPT

## 2021-08-23 PROCEDURE — 76815 OB US LIMITED FETUS(S): CPT

## 2021-08-23 PROCEDURE — 87077 CULTURE AEROBIC IDENTIFY: CPT

## 2021-08-23 PROCEDURE — 700105 HCHG RX REV CODE 258: Performed by: NURSE PRACTITIONER

## 2021-08-23 PROCEDURE — 83605 ASSAY OF LACTIC ACID: CPT

## 2021-08-23 PROCEDURE — 87186 SC STD MICRODIL/AGAR DIL: CPT

## 2021-08-23 PROCEDURE — 700102 HCHG RX REV CODE 250 W/ 637 OVERRIDE(OP): Performed by: OBSTETRICS & GYNECOLOGY

## 2021-08-23 PROCEDURE — G0378 HOSPITAL OBSERVATION PER HR: HCPCS

## 2021-08-23 PROCEDURE — A9270 NON-COVERED ITEM OR SERVICE: HCPCS | Performed by: NURSE PRACTITIONER

## 2021-08-23 PROCEDURE — 700105 HCHG RX REV CODE 258: Performed by: OBSTETRICS & GYNECOLOGY

## 2021-08-23 PROCEDURE — 80053 COMPREHEN METABOLIC PANEL: CPT

## 2021-08-23 PROCEDURE — 700111 HCHG RX REV CODE 636 W/ 250 OVERRIDE (IP): Performed by: NURSE PRACTITIONER

## 2021-08-23 PROCEDURE — 81001 URINALYSIS AUTO W/SCOPE: CPT

## 2021-08-23 RX ORDER — SODIUM CHLORIDE, SODIUM LACTATE, POTASSIUM CHLORIDE, AND CALCIUM CHLORIDE .6; .31; .03; .02 G/100ML; G/100ML; G/100ML; G/100ML
1000 INJECTION, SOLUTION INTRAVENOUS ONCE
Status: COMPLETED | OUTPATIENT
Start: 2021-08-23 | End: 2021-08-23

## 2021-08-23 RX ORDER — SODIUM CHLORIDE, SODIUM LACTATE, POTASSIUM CHLORIDE, CALCIUM CHLORIDE 600; 310; 30; 20 MG/100ML; MG/100ML; MG/100ML; MG/100ML
INJECTION, SOLUTION INTRAVENOUS CONTINUOUS
Status: DISCONTINUED | OUTPATIENT
Start: 2021-08-23 | End: 2021-08-24

## 2021-08-23 RX ORDER — OXYCODONE HYDROCHLORIDE 5 MG/1
5 TABLET ORAL EVERY 4 HOURS PRN
Status: DISCONTINUED | OUTPATIENT
Start: 2021-08-23 | End: 2021-08-26 | Stop reason: HOSPADM

## 2021-08-23 RX ORDER — ACETAMINOPHEN 500 MG
1000 TABLET ORAL EVERY 6 HOURS PRN
Status: DISCONTINUED | OUTPATIENT
Start: 2021-08-23 | End: 2021-08-26 | Stop reason: HOSPADM

## 2021-08-23 RX ADMIN — ACETAMINOPHEN 1000 MG: 500 TABLET ORAL at 03:51

## 2021-08-23 RX ADMIN — CEFTRIAXONE SODIUM 1 G: 1 INJECTION, POWDER, FOR SOLUTION INTRAMUSCULAR; INTRAVENOUS at 06:46

## 2021-08-23 RX ADMIN — SODIUM CHLORIDE, POTASSIUM CHLORIDE, SODIUM LACTATE AND CALCIUM CHLORIDE 1000 ML: 600; 310; 30; 20 INJECTION, SOLUTION INTRAVENOUS at 03:56

## 2021-08-23 RX ADMIN — SODIUM CHLORIDE, POTASSIUM CHLORIDE, SODIUM LACTATE AND CALCIUM CHLORIDE 1000 ML: 600; 310; 30; 20 INJECTION, SOLUTION INTRAVENOUS at 20:28

## 2021-08-23 RX ADMIN — SODIUM CHLORIDE, POTASSIUM CHLORIDE, SODIUM LACTATE AND CALCIUM CHLORIDE: 600; 310; 30; 20 INJECTION, SOLUTION INTRAVENOUS at 21:30

## 2021-08-23 RX ADMIN — OXYCODONE 5 MG: 5 TABLET ORAL at 07:57

## 2021-08-23 RX ADMIN — SODIUM CHLORIDE, POTASSIUM CHLORIDE, SODIUM LACTATE AND CALCIUM CHLORIDE 1000 ML: 600; 310; 30; 20 INJECTION, SOLUTION INTRAVENOUS at 06:43

## 2021-08-23 RX ADMIN — ACETAMINOPHEN 1000 MG: 500 TABLET ORAL at 19:36

## 2021-08-23 RX ADMIN — ACETAMINOPHEN 1000 MG: 500 TABLET ORAL at 13:30

## 2021-08-23 ASSESSMENT — PAIN DESCRIPTION - PAIN TYPE
TYPE: ACUTE PAIN

## 2021-08-23 NOTE — PROGRESS NOTES
0700 - Report received from Merlene PAUL, care assumed. [place] Gestation today at 22.3 weeks    0730 - Patient is in the bed resting quietly with the FOB - Alysha at the BS. The patient is not expecting visitors today. Reports little sleep last night, Alysha brought her food for breakfast, denies nausea. Denies contractions or cramping, denies ill feeling, reports her back pain is better since the tylenol this am (0351) but continues to nag her lower back - discussed pain medications and heat packs. Reports FM, no ROM no Bleeding, denies change to vision/edema/HA; states she has been getting up to the BR herself without assist, denies dizziness or weakness.  Elevated maternal pulse noted, denies report of high pulse in the past.     FHT at 155-160, discussed POC, sleepy/pleasant affect/mood, denies questions/concerns regarding care since arrival to [facility].   Encouraged to continue to drink water and rest.     Patient encouraged to call RN with all questions/concerns/needs. The dry erase board updated with RN/CNA contact information, reviewed.

## 2021-08-23 NOTE — H&P
History and Physical    Jan Montes De Oca is a 21 y.o. female  -Para:     Gestational Age:  22w3d  Admitted for:   Observation  Admitted to  [facility] Labor and Delivery.  [facility]    HPI: Patient is admitted with the above mentioned Chief Complaint and States   Loss of fluid:   negative  Abdominal Pain:  positive  Uterine Contractions:  negative  Vaginal Bleeding:  negative  Fetal Movement:  normal  Patient denies fever, chills, nausea, vomiting , headache, visual disturbance, or dysuria  No LMP recorded. Patient is pregnant.  Estimated Date of Delivery: 21  Final SAIDA: 2021, by Ultrasound    Patient Active Problem List    Diagnosis Date Noted   • ASCUS with positive high risk HPV cervical 2021   • Encounter for supervision of other normal pregnancy, second trimester 2021   • Urinary tract infection in mother during second trimester of pregnancy 2021       Admitting DX: Pregnancy   Pregnancy Complications:  maternal infection--UTI, untreated  OB Risk Factors:   non-compliance  Labor State:    Not in labor.    History:   has no past medical history of Addisons disease ([facility]), Adrenal disorder ([facility]), Allergy, Anemia, Anxiety, Arrhythmia, Arthritis, Asthma, Blood transfusion without reported diagnosis, Cancer ([facility]), Cataract, CHF (congestive heart failure) ([facility]), Clotting disorder ([facility]), COPD (chronic obstructive pulmonary disease) ([facility]), Cushings syndrome ([facility]), Depression, Diabetes ([facility]), Diabetic neuropathy ([facility]), GERD (gastroesophageal reflux disease), Glaucoma, Goiter, Head ache, Heart attack ([facility]), Heart murmur, HIV (human immunodeficiency virus infection) ([facility]), Hyperlipidemia, Hypertension, IBD (inflammatory bowel disease), Kidney disease, Meningitis, Migraine, Muscle disorder, Osteoporosis, Parathyroid disorder ([facility]), Pituitary disease ([facility]), Pulmonary emphysema ([facility]), Seizure ([facility]), Sickle cell disease ([facility]), Stroke ([facility]), Substance  "abuse (HCC), Thyroid disease, Tuberculosis, or Urinary tract infection.     has a past surgical history that includes cholecystectomy.    OB History    Para Term  AB Living   3 2 2 0 0 2   SAB TAB Ectopic Molar Multiple Live Births   0 0 0 0 0 2      # Outcome Date GA Lbr Akbar/2nd Weight Sex Delivery Anes PTL Lv   3 Current            2 Term 18 40w0d  3.175 kg (7 lb) F Vag-Spont None N MAGGIE   1 Term 13 40w0d  3.175 kg (7 lb) M Vag-Spont None N MAGGIE       Medications:  No current facility-administered medications on file prior to encounter.     No current outpatient medications on file prior to encounter.       Allergies:  Patient has no known allergies.    ROS:   Neuro: negative    Cardiovascular: negative  Gastro intestinal: negative  Genitourinary: positive for urinary tract infections            Physical Exam:  /61   Pulse (!) 137   Temp (!) 38.6 °C (101.4 °F) (Oral)   Resp 18   Ht 1.575 m (5' 2\")   Wt 65.8 kg (145 lb)   BMI 26.52 kg/m²   Constitutional: healthy-appearing, Well-developed, well-nourished, in mildly distress  No JVD: while supine  HEENT: EOMI  Breast Exam: Inspection negative. No nipple discharge or bleeding. No masses or nodularity palpable  Cardio: regular rhythm, tachycardia present 115-140 bpm  Lung: unlabored respirations, no intercostal retractions or accessory muscle use, clear to auscultation without rales or wheezes  Abdomen: abdomen is soft without significant tenderness, masses, organomegaly or guarding, CVA tenderness absent (present and severe on admission), pelvic exam: exam chaperoned by nurse, normal vagina and vulva, normal cervix without lesions, polyps or tenderness, uterus normal size, shape, consistency, no mass or tenderness, adnexa normal in size without mass or tenderness  Extremity: extremities, peripheral pulses and reflexes normal, no edema, redness or tenderness in the calves or thighs, Homans sign is negative, no sign of DVT, feet " normal, good pulses, normal color, temperature and sensation    Cervical Exam: 0%  Cervix Dilatation: closed  Station: negative 4  Pelvis: Adequate  Fetal Assessment: Baseline FHR: 175 per minute  Estimated Fetal Weight: Less than 1500g      Labs:  Recent Labs     08/23/21  0350   WBC 9.6   RBC 3.88*   HEMOGLOBIN 12.2   HEMATOCRIT 34.8*   MCV 89.7   MCH 31.4   MCHC 35.1*   RDW 43.2   PLATELETCT 187   MPV 10.8     Prenatal Results     General (Most Recent Result)     Test Value Date Time    ABO  O  05/05/21 0651    Rh  Positive  05/05/21 0651    Antibody screen  Negative  05/05/21 0651    HbA1c       Chlamydia by PCR  Negative  07/30/21 1023    Gonorrhea by PCR  Negative  07/30/21 1023    RPR/Syphilus       HSV 1/2 by PCR (non-serum)       HSV 1/2 (serum)       HSV 1       HSV 2       HPV (16)  Negative  07/30/21 1023    HBsAg  Negative  05/05/21 0651    HIV-1 HIV-2 Antibodies       Rubella  1.31 index 05/05/21 0651    Tb             Pap Smear (Most Recent Result)     Test Value Date Time    Pap smear       Pap smear w/HPV       Pap smear w/CTNG       Pap smar w/HPV CTNG       Pap smear (reflex HPV ACUS)       Pap smear (reflex HPV ASCUS w/CTNG)  (See Report)   07/30/21 1023    Pathology gyn specimen   Abnormal    (See Report)   07/30/21 1023          Urinalysis (Most Recent Result)     Test Value Date Time    Urinalysis       POC urinalysis  (See Report)   07/30/21 0848    Urine drug screen (w/ conf)       Urine culture ([id number])  (See Report)   08/23/21 0340    Urine Protein/Creatinine Ratio             Urinalysis, Culture if indicated     Test Value Date Time    Color       Appearance       Specific Gravity       PH       Glucose       Ketones       Protein       Bilirubin       Nitrites       Leukocytes Esterase       Blood       Comment       Culture             Urine Drug Screen     Test Value Date Time    Amphetamines       Barbiturates       Benzodiazepines       Cocaine       Methadone       Opiates        Oxycodone       Phencylidine       Propoxyphene       Marijuana Metabolite             1st Trimester     Test Value Date Time    Hgb  13.9 g/dL 05/05/21 0651    Hct  41.4 % 05/05/21 0651    Fasting Glucose Tolerance       GTT, 1 hour       GTT, 2 hours       GTT, 3 hours             2nd Trimester     Test Value Date Time    Hgb  12.2 g/dL 08/23/21 0350    Hct  34.8 % 08/23/21 0350    AST  15 U/L 08/23/21 0350    ALT  5 U/L 08/23/21 0350    Uric Acid       Fasting Glucose Tolerance       GTT, 1 hour       GTT, 2 hours       GTT, 3 hours             3rd Trimester     Test Value Date Time    Hgb       Hct       Platelet count       GBS (RAMÍREZ BROTH)       Fasting Glucose Tolerance       GTT, 1 hour       GTT, 2 hous       GTT, 3hours             Congenital Disease Screening     Test Value Date Time    First Trimester Screen       Quad Screen       BH Electrophoresis       Cystic Fibrosis Carrier Study       SMA       AFP Maternal Serum        AFP Tetra       NIPT             Legend    ^: Historical                          Assessment:  Gestational Age:  22w3d  Labor State:   Antepartum  Risk Factors:   non-compliance  Pregnancy Complications: UTI without treatment    Patient Active Problem List    Diagnosis Date Noted   • ASCUS with positive high risk HPV cervical 08/06/2021   • Encounter for supervision of other normal pregnancy, second trimester 07/30/2021   • Urinary tract infection in mother during second trimester of pregnancy 07/30/2021       Plan:   Admitted for: Observation  Antibiotics, repeat labs  CBC, CMP and UA  routine urinalysis with culture  Antibiotics: For Infection- Rocephin    Labs WNL, Lactic Acid 1.1, WBC 9.6  US of kidneys and obstetrical WNL    Tachycardia improved with IV fluids and Tylenol  Rocephin now  Trend labs for 12 hours    Marissa Bal C.N.M.      Dr Cordero is the attending

## 2021-08-23 NOTE — PROGRESS NOTES
0615-Report from Lola CUNNINGHAM Pt transferred to [id number]  0700-Report given to Humera CUNNINGHAM POC discussed

## 2021-08-23 NOTE — CARE PLAN
Problem: Knowledge Deficit - L&D  Goal: Patient and family/caregivers will demonstrate understanding of plan of care, disease process/condition, diagnostic tests and medications  Outcome: Progressing     Problem: Psychosocial - L&D  Goal: Patient's level of anxiety will decrease  Outcome: Progressing     Problem: Pain  Goal: Patient's pain will be alleviated or reduced to the patient’s comfort goal  Outcome: Progressing     Problem: Risk for Infection and Impaired Wound Healing  Goal: Patient will remain free from infection  Outcome: Progressing     Problem: Risk for Injury  Goal: Patient and fetus will be free of preventable injury/complications  Outcome: Progressing

## 2021-08-23 NOTE — PROGRESS NOTES
0325: Pt arrived to triage with complaint of abdominal and back pain, as well as fever. Pt reports fetal movement, denies LOF or VB. EFM/Bangs applied.     0338: Call made to Vasiliy with update on pt and vitals. Pt noted to be febrile and tachycardic. Orders received. Provider to come to bedside.     0440: Ultrasound at bedside.     0600: Negative covid test result reviewed, ultrasounds reviewed. Vasiliy at bedside, order to admit for 23 hr obs.     0615: Report to Merlene PAUL. POC discussed.

## 2021-08-24 LAB
ANION GAP SERPL CALC-SCNC: 12 MMOL/L (ref 7–16)
BASOPHILS # BLD AUTO: 0.3 % (ref 0–1.8)
BASOPHILS # BLD: 0.03 K/UL (ref 0–0.12)
BUN SERPL-MCNC: 2 MG/DL (ref 8–22)
CALCIUM SERPL-MCNC: 8.4 MG/DL (ref 8.5–10.5)
CHLORIDE SERPL-SCNC: 105 MMOL/L (ref 96–112)
CO2 SERPL-SCNC: 20 MMOL/L (ref 20–33)
CREAT SERPL-MCNC: 0.26 MG/DL (ref 0.5–1.4)
CRP SERPL HS-MCNC: 15.15 MG/DL (ref 0–0.75)
EOSINOPHIL # BLD AUTO: 0 K/UL (ref 0–0.51)
EOSINOPHIL NFR BLD: 0 % (ref 0–6.9)
ERYTHROCYTE [DISTWIDTH] IN BLOOD BY AUTOMATED COUNT: 44.1 FL (ref 35.9–50)
GLUCOSE SERPL-MCNC: 101 MG/DL (ref 65–99)
HCT VFR BLD AUTO: 32 % (ref 37–47)
HGB BLD-MCNC: 10.9 G/DL (ref 12–16)
IMM GRANULOCYTES # BLD AUTO: 0.1 K/UL (ref 0–0.11)
IMM GRANULOCYTES NFR BLD AUTO: 0.9 % (ref 0–0.9)
LACTATE BLD-SCNC: 0.8 MMOL/L (ref 0.5–2)
LACTATE BLD-SCNC: 1 MMOL/L (ref 0.5–2)
LACTATE BLD-SCNC: 1.2 MMOL/L (ref 0.5–2)
LYMPHOCYTES # BLD AUTO: 0.85 K/UL (ref 1–4.8)
LYMPHOCYTES NFR BLD: 7.4 % (ref 22–41)
MCH RBC QN AUTO: 31.1 PG (ref 27–33)
MCHC RBC AUTO-ENTMCNC: 34.1 G/DL (ref 33.6–35)
MCV RBC AUTO: 91.4 FL (ref 81.4–97.8)
MONOCYTES # BLD AUTO: 0.39 K/UL (ref 0–0.85)
MONOCYTES NFR BLD AUTO: 3.4 % (ref 0–13.4)
NEUTROPHILS # BLD AUTO: 10.18 K/UL (ref 2–7.15)
NEUTROPHILS NFR BLD: 88 % (ref 44–72)
NRBC # BLD AUTO: 0 K/UL
NRBC BLD-RTO: 0 /100 WBC
PLATELET # BLD AUTO: 181 K/UL (ref 164–446)
PMV BLD AUTO: 10.8 FL (ref 9–12.9)
POTASSIUM SERPL-SCNC: 3.2 MMOL/L (ref 3.6–5.5)
PROCALCITONIN SERPL-MCNC: 1.01 NG/ML
RBC # BLD AUTO: 3.5 M/UL (ref 4.2–5.4)
SODIUM SERPL-SCNC: 137 MMOL/L (ref 135–145)
WBC # BLD AUTO: 11.6 K/UL (ref 4.8–10.8)

## 2021-08-24 PROCEDURE — 85025 COMPLETE CBC W/AUTO DIFF WBC: CPT

## 2021-08-24 PROCEDURE — 99225 PR SUBSEQUENT OBSERVATION CARE,LEVEL II: CPT | Mod: 25 | Performed by: OBSTETRICS & GYNECOLOGY

## 2021-08-24 PROCEDURE — 700102 HCHG RX REV CODE 250 W/ 637 OVERRIDE(OP): Performed by: NURSE PRACTITIONER

## 2021-08-24 PROCEDURE — 700105 HCHG RX REV CODE 258: Performed by: NURSE PRACTITIONER

## 2021-08-24 PROCEDURE — 80048 BASIC METABOLIC PNL TOTAL CA: CPT

## 2021-08-24 PROCEDURE — A9270 NON-COVERED ITEM OR SERVICE: HCPCS | Performed by: NURSE PRACTITIONER

## 2021-08-24 PROCEDURE — 96365 THER/PROPH/DIAG IV INF INIT: CPT

## 2021-08-24 PROCEDURE — G0378 HOSPITAL OBSERVATION PER HR: HCPCS

## 2021-08-24 PROCEDURE — 86140 C-REACTIVE PROTEIN: CPT

## 2021-08-24 PROCEDURE — 83605 ASSAY OF LACTIC ACID: CPT

## 2021-08-24 PROCEDURE — 700111 HCHG RX REV CODE 636 W/ 250 OVERRIDE (IP): Performed by: NURSE PRACTITIONER

## 2021-08-24 PROCEDURE — 59025 FETAL NON-STRESS TEST: CPT | Mod: 26 | Performed by: OBSTETRICS & GYNECOLOGY

## 2021-08-24 PROCEDURE — 36415 COLL VENOUS BLD VENIPUNCTURE: CPT

## 2021-08-24 PROCEDURE — 700105 HCHG RX REV CODE 258

## 2021-08-24 PROCEDURE — 302154 KIT COOLING VEST BARIATRIC BLANKET: Performed by: OBSTETRICS & GYNECOLOGY

## 2021-08-24 PROCEDURE — 84145 PROCALCITONIN (PCT): CPT

## 2021-08-24 RX ORDER — SODIUM CHLORIDE 9 MG/ML
INJECTION, SOLUTION INTRAVENOUS CONTINUOUS
Status: DISCONTINUED | OUTPATIENT
Start: 2021-08-24 | End: 2021-08-26 | Stop reason: HOSPADM

## 2021-08-24 RX ADMIN — ACETAMINOPHEN 1000 MG: 500 TABLET ORAL at 19:37

## 2021-08-24 RX ADMIN — ACETAMINOPHEN 1000 MG: 500 TABLET ORAL at 04:11

## 2021-08-24 RX ADMIN — SODIUM CHLORIDE: 9 INJECTION, SOLUTION INTRAVENOUS at 19:38

## 2021-08-24 RX ADMIN — CEFTRIAXONE SODIUM 1 G: 1 INJECTION, POWDER, FOR SOLUTION INTRAMUSCULAR; INTRAVENOUS at 06:30

## 2021-08-24 RX ADMIN — ACETAMINOPHEN 1000 MG: 500 TABLET ORAL at 11:23

## 2021-08-24 ASSESSMENT — PATIENT HEALTH QUESTIONNAIRE - PHQ9
1. LITTLE INTEREST OR PLEASURE IN DOING THINGS: NOT AT ALL
2. FEELING DOWN, DEPRESSED, IRRITABLE, OR HOPELESS: NOT AT ALL
SUM OF ALL RESPONSES TO PHQ9 QUESTIONS 1 AND 2: 0
2. FEELING DOWN, DEPRESSED, IRRITABLE, OR HOPELESS: NOT AT ALL
1. LITTLE INTEREST OR PLEASURE IN DOING THINGS: NOT AT ALL
SUM OF ALL RESPONSES TO PHQ9 QUESTIONS 1 AND 2: 0

## 2021-08-24 NOTE — PROGRESS NOTES
Evaluated patient after had another fever. She c/o fever and chills with sweating. Has pain in back and neck. Her neck pain started 3 days prior to her first fever.  Did not do any physical activity or abnormal sleeping pattern that would cause neck pain.  She reports no trauma to back or neck.  She denies any headache.  Reports no abdominal pain except when urinating.  Denies abnormal bowels, last BM was 2 days ago and normal.  No other concerns    Denies lof, vb, ctx; reports good fm    Vitals:    08/23/21 2117   BP:    Pulse: (!) 122   Resp:    Temp: 36.4 °C (97.5 °F)   SpO2: 95%     Gen: diaphoretic, ill appearing   Abd: non tender diffusely, gravid with nontender uterus  MMsk: paraspinal muscles with pain to light palpation, this extends to neck.  Kernig and Brudzinski signs were negative.  CVA was negative.    Ext: no COURTNEY  Skin: no redness or irritation noted    A/P Likely pyelonephritis vs other infectious etiology  - Urine does not appear to be of concern, but there is no other source currently  - Neck pain is concerning due to its severity, however has no headache and no meningeal signs; will continue to monitor this closely  - Continue rocephin, pending culture  - In patient until improvement clinically    Nichol Sena D.O.

## 2021-08-24 NOTE — PROGRESS NOTES
1900: Report received from Wesley PAUL. POC discussed.     1945: Doppler done, fetal hr noted to be 180.     1955: Dr Sena made aware of fetal tachycardia, order to continue to monitor. MD made aware pt febrile. Cooling blanket ordered.     2010: MD at bedside. No new orders received.     2025: MD made aware of pt sustained elevated temperature. Order to give 1L bolus and 125ml/hr maintenance fluid.     0412: Dr Sena made aware of pt temperature of 102.5. Tylenol given. Plan to recheck in 30 minutes. Will call MD if temperature still elevated.     0700: Report given to Saima PAUL. POC discussed.

## 2021-08-24 NOTE — PROGRESS NOTES
Late Entry:  0700- report received from Lola PAUL. Accepted care of pt. Pt here for back pain and fevers. Had fevers overnight.   0940- VSS, still tachycardic. Pt rates pain 2/10. , +FM, pt denies LOF, UC or VB. Educated pt to call if she has fever sx. Verbalized understanding. Call light in reach.   1120- pt called out stating she is having chills again. Temp 99.6f.   1500- pt up to shower. Olimpia changed.   1735- Dr. Zavaleta at bedside.   1900- report to Katarina PAUL.

## 2021-08-25 LAB
ALBUMIN SERPL BCP-MCNC: 2.8 G/DL (ref 3.2–4.9)
ALBUMIN/GLOB SERPL: 1.1 G/DL
ALP SERPL-CCNC: 73 U/L (ref 30–99)
ALT SERPL-CCNC: <5 U/L (ref 2–50)
ANION GAP SERPL CALC-SCNC: 8 MMOL/L (ref 7–16)
AST SERPL-CCNC: 13 U/L (ref 12–45)
BACTERIA UR CULT: ABNORMAL
BACTERIA UR CULT: ABNORMAL
BASOPHILS # BLD AUTO: 0.4 % (ref 0–1.8)
BASOPHILS # BLD: 0.03 K/UL (ref 0–0.12)
BILIRUB SERPL-MCNC: 0.3 MG/DL (ref 0.1–1.5)
BUN SERPL-MCNC: 3 MG/DL (ref 8–22)
CALCIUM SERPL-MCNC: 7.8 MG/DL (ref 8.5–10.5)
CHLORIDE SERPL-SCNC: 110 MMOL/L (ref 96–112)
CO2 SERPL-SCNC: 21 MMOL/L (ref 20–33)
CREAT SERPL-MCNC: 0.26 MG/DL (ref 0.5–1.4)
EOSINOPHIL # BLD AUTO: 0.03 K/UL (ref 0–0.51)
EOSINOPHIL NFR BLD: 0.4 % (ref 0–6.9)
ERYTHROCYTE [DISTWIDTH] IN BLOOD BY AUTOMATED COUNT: 43.3 FL (ref 35.9–50)
GLOBULIN SER CALC-MCNC: 2.6 G/DL (ref 1.9–3.5)
GLUCOSE SERPL-MCNC: 84 MG/DL (ref 65–99)
HCT VFR BLD AUTO: 31 % (ref 37–47)
HGB BLD-MCNC: 10.7 G/DL (ref 12–16)
IMM GRANULOCYTES # BLD AUTO: 0.07 K/UL (ref 0–0.11)
IMM GRANULOCYTES NFR BLD AUTO: 1 % (ref 0–0.9)
LYMPHOCYTES # BLD AUTO: 0.99 K/UL (ref 1–4.8)
LYMPHOCYTES NFR BLD: 14.1 % (ref 22–41)
MCH RBC QN AUTO: 31.3 PG (ref 27–33)
MCHC RBC AUTO-ENTMCNC: 34.5 G/DL (ref 33.6–35)
MCV RBC AUTO: 90.6 FL (ref 81.4–97.8)
MONOCYTES # BLD AUTO: 0.39 K/UL (ref 0–0.85)
MONOCYTES NFR BLD AUTO: 5.5 % (ref 0–13.4)
NEUTROPHILS # BLD AUTO: 5.53 K/UL (ref 2–7.15)
NEUTROPHILS NFR BLD: 78.6 % (ref 44–72)
NRBC # BLD AUTO: 0 K/UL
NRBC BLD-RTO: 0 /100 WBC
PLATELET # BLD AUTO: 176 K/UL (ref 164–446)
PMV BLD AUTO: 10.8 FL (ref 9–12.9)
POTASSIUM SERPL-SCNC: 3.5 MMOL/L (ref 3.6–5.5)
PROT SERPL-MCNC: 5.4 G/DL (ref 6–8.2)
RBC # BLD AUTO: 3.42 M/UL (ref 4.2–5.4)
SIGNIFICANT IND 70042: ABNORMAL
SITE SITE: ABNORMAL
SODIUM SERPL-SCNC: 139 MMOL/L (ref 135–145)
SOURCE SOURCE: ABNORMAL
WBC # BLD AUTO: 7 K/UL (ref 4.8–10.8)

## 2021-08-25 PROCEDURE — 80053 COMPREHEN METABOLIC PANEL: CPT

## 2021-08-25 PROCEDURE — 700111 HCHG RX REV CODE 636 W/ 250 OVERRIDE (IP)

## 2021-08-25 PROCEDURE — 85025 COMPLETE CBC W/AUTO DIFF WBC: CPT

## 2021-08-25 PROCEDURE — 36415 COLL VENOUS BLD VENIPUNCTURE: CPT

## 2021-08-25 PROCEDURE — 302790 HCHG STAT ANTEPARTUM CARE, DAILY

## 2021-08-25 PROCEDURE — 770002 HCHG ROOM/CARE - OB PRIVATE (112)

## 2021-08-25 PROCEDURE — 700105 HCHG RX REV CODE 258

## 2021-08-25 PROCEDURE — 700101 HCHG RX REV CODE 250: Performed by: OBSTETRICS & GYNECOLOGY

## 2021-08-25 PROCEDURE — 99224 PR SUBSEQUENT OBSERVATION CARE,LEVEL I: CPT | Performed by: OBSTETRICS & GYNECOLOGY

## 2021-08-25 RX ORDER — SODIUM CHLORIDE AND POTASSIUM CHLORIDE 150; 900 MG/100ML; MG/100ML
INJECTION, SOLUTION INTRAVENOUS CONTINUOUS
Status: DISCONTINUED | OUTPATIENT
Start: 2021-08-25 | End: 2021-08-26 | Stop reason: HOSPADM

## 2021-08-25 RX ADMIN — POTASSIUM CHLORIDE AND SODIUM CHLORIDE: 900; 150 INJECTION, SOLUTION INTRAVENOUS at 18:11

## 2021-08-25 RX ADMIN — CEFTRIAXONE SODIUM 1 G: 1 INJECTION, POWDER, FOR SOLUTION INTRAMUSCULAR; INTRAVENOUS at 05:59

## 2021-08-25 RX ADMIN — POTASSIUM CHLORIDE AND SODIUM CHLORIDE: 900; 150 INJECTION, SOLUTION INTRAVENOUS at 01:52

## 2021-08-25 RX ADMIN — POTASSIUM CHLORIDE AND SODIUM CHLORIDE: 900; 150 INJECTION, SOLUTION INTRAVENOUS at 10:17

## 2021-08-25 NOTE — PROGRESS NOTES
ANTEPARTUM PROGRESS NOTE;    Jan Montes De Oca is a 21 y.o. female  at 22w4d.  Patient was admitted with presumptive diagnosis of urinary tract infection but also had back pain and neck pain along with headache.  Patient states her back pain neck pain and discomfort are all relieved no headache anymore.  Good fetal movement no contractions no leakage of fluid.  No vaginal bleeding  T-max at 0500 hrs. this morning 102.2 °F    Patient Active Problem List    Diagnosis Date Noted   • ASCUS with positive high risk HPV cervical 2021   • Encounter for supervision of other normal pregnancy, second trimester 2021   • Urinary tract infection in mother during second trimester of pregnancy 2021       Review of systems; denies vaginal bleeding, leakage of fluid, uterine contractions, fever chills or abdominal pain  No past medical history on file.  Past Surgical History:   Procedure Laterality Date   • CHOLECYSTECTOMY       Patient has no known allergies.  Social History     Socioeconomic History   • Marital status: Single     Spouse name: Not on file   • Number of children: Not on file   • Years of education: Not on file   • Highest education level: Not on file   Occupational History   • Not on file   Tobacco Use   • Smoking status: Never Smoker   • Smokeless tobacco: Never Used   Vaping Use   • Vaping Use: Never used   Substance and Sexual Activity   • Alcohol use: Not Currently   • Drug use: Never   • Sexual activity: Yes     Partners: Male   Other Topics Concern   • Not on file   Social History Narrative   • Not on file     Social Determinants of Health     Financial Resource Strain:    • Difficulty of Paying Living Expenses:    Food Insecurity:    • Worried About Running Out of Food in the Last Year:    • Ran Out of Food in the Last Year:    Transportation Needs:    • Lack of Transportation (Medical):    • Lack of Transportation (Non-Medical):    Physical Activity:    • Days of Exercise per Week:    •  "Minutes of Exercise per Session:    Stress:    • Feeling of Stress :    Social Connections:    • Frequency of Communication with Friends and Family:    • Frequency of Social Gatherings with Friends and Family:    • Attends [employer] Services:    • Active Member of Clubs or Organizations:    • Attends Club or Organization Meetings:    • Marital Status:    Intimate Partner Violence:    • Fear of Current or Ex-Partner:    • Emotionally Abused:    • Physically Abused:    • Sexually Abused:          Physical examination;  Alert and oriented x3  Gen.-well-developed well-nourished female in no apparent distress  Neck-no meningeal signs  HEENT-normocephalic, nontraumatic,EOMI,PERRLA  /63   Pulse (!) 121   Temp 36.6 °C (97.8 °F) (Temporal)   Resp 16   Ht 1.575 m (5' 2\")   Wt 65.8 kg (145 lb)   SpO2 97%   BMI 26.52 kg/m²   Skin is warm and dry  Back-negative for CVA tenderness  Cardiovascular-regular rate and rhythm, normal S1-S2 no murmurs gallops  Lungs-clear to auscultation bilaterally  Abdomen-nondistended positive bowel sounds soft nontender without masses or hepatosplenomegaly  Cervix- NE  Extremities without cyanosis clubbing or edema  Neurologic grossly intact    Labs;  Preliminary report on urine culture-gram-negative andrei-likely E. coli  Sensitivities are pending    NST-as performed and read by myself; reactive NST without contractions    Impression;  IUP AT 22w4d  Urinary tract infection    Plan;  She has received 2 doses of Rocephin-continue continue daily Rocephin      Jaquan Zavaleta MD  "

## 2021-08-25 NOTE — PROGRESS NOTES
OB Progress Note    Date of Admission: 8/23/2021    Subjective:   Pt reports doing well, denies fevers or pain.  +FM.    Objective:   24hr VS:  Temp:  [36.1 °C (96.9 °F)-37.6 °C (99.6 °F)] 36.1 °C (96.9 °F)  Pulse:  [] 105  Resp:  [14-17] 17  BP: ()/(53-63) 101/58  SpO2:  [82 %-100 %] 96 %    Gen: AAO, NAD  CV: RRR,   Resp: ctab  Abdomen: gravid, soft, NT   No CVA tenderness  Ext: NT, no edema      Meds:     Current Facility-Administered Medications:   •  0.9 % NaCl with KCl 20 mEq infusion, , Intravenous, Continuous, Jaquan Zavaleta M.D., Last Rate: 125 mL/hr at 08/25/21 0152, New Bag at 08/25/21 0152  •  cefTRIAXone (Rocephin) 1 g in  mL IVPB, 1 g, Intravenous, Q24HRS, Alicja Cole M.D., Stopped at 08/25/21 0629  •  NS infusion, , Intravenous, Continuous, Alicja Cole M.D., Last Rate: 125 mL/hr at 08/24/21 1938, New Bag at 08/24/21 1938  •  acetaminophen (TYLENOL) tablet 1,000 mg, 1,000 mg, Oral, Q6HRS PRN, Marissa Bal C.N.M., 1,000 mg at 08/24/21 1937  •  oxyCODONE immediate-release (ROXICODONE) tablet 5 mg, 5 mg, Oral, Q4HRS PRN, Macarena Cordero M.D., 5 mg at 08/23/21 0757    Labs:    Results for ALICIA DAMICO (MRN [id number]) as of 8/25/2021 09:27   Ref. Range 8/25/2021 05:17   Sodium Latest Ref Range: 135 - 145 mmol/L 139   Potassium Latest Ref Range: 3.6 - 5.5 mmol/L 3.5 (L)   Chloride Latest Ref Range: 96 - 112 mmol/L 110   Co2 Latest Ref Range: 20 - 33 mmol/L 21   Anion Gap Latest Ref Range: 7.0 - 16.0  8.0   Glucose Latest Ref Range: 65 - 99 mg/dL 84   Bun Latest Ref Range: 8 - 22 mg/dL 3 (L)   Creatinine Latest Ref Range: 0.50 - 1.40 mg/dL 0.26 (L)   GFR If  Latest Ref Range: >60 mL/min/1.73 m 2 >60   GFR If Non  Latest Ref Range: >60 mL/min/1.73 m 2 >60   Calcium Latest Ref Range: 8.5 - 10.5 mg/dL 7.8 (L)   AST(SGOT) Latest Ref Range: 12 - 45 U/L 13   ALT(SGPT) Latest Ref Range: 2 - 50 U/L <5   Alkaline Phosphatase Latest Ref Range: 30 - 99 U/L  73   Total Bilirubin Latest Ref Range: 0.1 - 1.5 mg/dL 0.3   Albumin Latest Ref Range: 3.2 - 4.9 g/dL 2.8 (L)   Total Protein Latest Ref Range: 6.0 - 8.2 g/dL 5.4 (L)   Globulin Latest Ref Range: 1.9 - 3.5 g/dL 2.6   A-G Ratio Latest Units: g/dL 1.1     Urine Culture  positive for E coli, pansensitive except for levofloxacin      A/P:  21 y.o.  @ 22w5d admitted with suspected pyelonephritis  Pyelonephritis:    Today is day #3 of IV rocephin.   Last fever  @ 0400 to 102.5; WBC count decreasing today.  Clinically well. Tachycardia improved (70s- low 100s) since last fever   UCx +E coli; no blood cx on admission, if additional fevers will plan to draw.   Assuming remains afebrile, consider change to PO abx tomorrow and possible discharge.      - fetal monitoring: doppler daily.    Pt reports she may have to leave today to care for her kids; discussed risk of sepsis/worsening disease if she hasn't had enough IV abx; ideally if she remains afebrile and clinically well today would consider transition tomorrow AM to PO abx with possible discharge tomorrow     dispo: cont antepartum care    Vangie Vasquez MD  [facility], Women's Health

## 2021-08-25 NOTE — PROGRESS NOTES
0700: Report received from Katarina MCCARTY RN. POC discussed.     0830: Doppler applied. .     0835: Assessment done. Pt denies pain, UCs, LOF, or VB. Reports + FM. Discussed POC with pt. All questions answered. Encouraged pt to call with needs.     0925: Dr. Cottrell at bedside. Discussed POC with pt.     1900: Report given to Aleta PÉREZ RN.

## 2021-08-25 NOTE — CARE PLAN
The patient is Watcher - Medium risk of patient condition declining or worsening         Progress made toward(s) clinical / shift goals:  normothermic

## 2021-08-26 VITALS
WEIGHT: 145 LBS | DIASTOLIC BLOOD PRESSURE: 51 MMHG | SYSTOLIC BLOOD PRESSURE: 88 MMHG | OXYGEN SATURATION: 99 % | TEMPERATURE: 97.3 F | HEART RATE: 99 BPM | RESPIRATION RATE: 18 BRPM | HEIGHT: 62 IN | BODY MASS INDEX: 26.68 KG/M2

## 2021-08-26 PROCEDURE — 700105 HCHG RX REV CODE 258

## 2021-08-26 PROCEDURE — 99238 HOSP IP/OBS DSCHRG MGMT 30/<: CPT | Performed by: OBSTETRICS & GYNECOLOGY

## 2021-08-26 PROCEDURE — 700111 HCHG RX REV CODE 636 W/ 250 OVERRIDE (IP)

## 2021-08-26 PROCEDURE — 700101 HCHG RX REV CODE 250: Performed by: OBSTETRICS & GYNECOLOGY

## 2021-08-26 RX ORDER — NITROFURANTOIN 25; 75 MG/1; MG/1
100 CAPSULE ORAL
Qty: 90 CAPSULE | Refills: 1 | Status: SHIPPED | OUTPATIENT
Start: 2021-08-26 | End: 2021-09-03

## 2021-08-26 RX ORDER — SULFAMETHOXAZOLE AND TRIMETHOPRIM 800; 160 MG/1; MG/1
1 TABLET ORAL 2 TIMES DAILY
Qty: 20 TABLET | Refills: 0 | Status: SHIPPED | OUTPATIENT
Start: 2021-08-26 | End: 2021-09-05

## 2021-08-26 RX ADMIN — POTASSIUM CHLORIDE AND SODIUM CHLORIDE: 900; 150 INJECTION, SOLUTION INTRAVENOUS at 02:30

## 2021-08-26 RX ADMIN — CEFTRIAXONE SODIUM 1 G: 1 INJECTION, POWDER, FOR SOLUTION INTRAMUSCULAR; INTRAVENOUS at 05:57

## 2021-08-26 NOTE — DISCHARGE SUMMARY
Discharge Summary:     Date of Admission: 2021  Date of Discharge: 21      Admitting diagnosis:    1. SIUP @ 22w3d  2. Pyelonephritis      Discharge Diagnosis:   1. SIUP @ 22w6d      No past medical history on file.  OB History    Para Term  AB Living   3 2 2 0 0 2   SAB TAB Ectopic Molar Multiple Live Births   0 0 0 0 0 2      # Outcome Date GA Lbr Akbar/2nd Weight Sex Delivery Anes PTL Lv   3 Current            2 Term 18 40w0d  3.175 kg (7 lb) F Vag-Spont None N AMGGIE   1 Term 13 40w0d  3.175 kg (7 lb) M Vag-Spont None N MAGGIE     Past Surgical History:   Procedure Laterality Date   • CHOLECYSTECTOMY       Patient has no known allergies.    Patient Active Problem List   Diagnosis   • Encounter for supervision of other normal pregnancy, second trimester   • Urinary tract infection in mother during second trimester of pregnancy   • ASCUS with positive high risk HPV cervical       Hospital Course:   21 y.o. now  presented to OB ED for fevers and abdominal pain.  Patient was suspected to have early pyelonephritis and was admitted for IV Rocephin.  Her urine culture did go pansensitive E. coli to everything except Levaquin.  She received 4 doses of IV Rocephin, was 48 hours afebrile at time of discharge, and was discharged to complete a 14-day course of Bactrim for pyelonephritis treatmen with plan to start nightly Macrobid for prophylaxis after treatment course completed. +FHTs remained throughout her stay with intermittent Doppler checks.  Day of discharge patient denies fevers, abdominal pain, and back pain.  Patient denies dysuria and feels ready for discharge.      Physical Exam:  Temp:  [35.7 °C (96.2 °F)-36.3 °C (97.3 °F)] 36.3 °C (97.3 °F)  Pulse:  [] 81  Resp:  [16-18] 18  BP: ()/(51-58) 88/51  SpO2:  [94 %-100 %] 97 %  Physical Exam  General: well   CV: Regular rate and rhythm respiratory: Clear to auscultation bilaterally  Abdomen: Soft, nontender,  gravid  Extremities: no edema, calves nontender    Current Facility-Administered Medications   Medication Dose   • 0.9 % NaCl with KCl 20 mEq infusion     • cefTRIAXone (Rocephin) 1 g in  mL IVPB  1 g   • NS infusion     • acetaminophen (TYLENOL) tablet 1,000 mg  1,000 mg   • oxyCODONE immediate-release (ROXICODONE) tablet 5 mg  5 mg       Recent Labs     08/23/21 2055 08/24/21  0020 08/25/21  0517   WBC 12.5* 11.6* 7.0   RBC 3.47* 3.50* 3.42*   HEMOGLOBIN 10.7* 10.9* 10.7*   HEMATOCRIT 31.1* 32.0* 31.0*   MCV 89.6 91.4 90.6   MCH 30.8 31.1 31.3   MCHC 34.4 34.1 34.5   RDW 43.2 44.1 43.3   PLATELETCT 171 181 176   MPV 11.0 10.8 10.8         Activity/ Discharge Instructions::   Discharge to home   Activity as tolerated  Call or come to ED for: heavy vaginal bleeding, fever >100.4, severe abdominal pain, severe headache, chest pain, shortness of breath,  N/V, incisional drainage, or other concerns.       Follow up: Tomorrow for scheduled OB visit      Discharge Meds:   Current Outpatient Medications   Medication Sig Dispense Refill   • sulfamethoxazole-trimethoprim (BACTRIM DS) 800-160 MG tablet Take 1 Tablet by mouth 2 times a day for 10 days. 20 Tablet 0   • nitrofurantoin (MACROBID) 100 MG Cap Take 1 Capsule by mouth at bedtime. 90 Capsule 1   Prenatal vitamin      Fevers, increasing pain, other concerns    Vangie Vasquez MD  [facility], Women's Health

## 2021-09-03 ENCOUNTER — ROUTINE PRENATAL (OUTPATIENT)
Dept: OBGYN | Facility: CLINIC | Age: 21
End: 2021-09-03
Payer: COMMERCIAL

## 2021-09-03 VITALS — WEIGHT: 137 LBS | DIASTOLIC BLOOD PRESSURE: 60 MMHG | SYSTOLIC BLOOD PRESSURE: 110 MMHG | BODY MASS INDEX: 25.06 KG/M2

## 2021-09-03 DIAGNOSIS — O09.92 ENCOUNTER FOR SUPERVISION OF HIGH RISK PREGNANCY IN SECOND TRIMESTER, ANTEPARTUM: Primary | ICD-10-CM

## 2021-09-03 PROCEDURE — 90040 PR PRENATAL FOLLOW UP: CPT | Performed by: NURSE PRACTITIONER

## 2021-09-03 RX ORDER — NITROFURANTOIN 25; 75 MG/1; MG/1
100 CAPSULE ORAL DAILY
Qty: 90 CAPSULE | Refills: 1 | Status: ON HOLD | OUTPATIENT
Start: 2021-09-03 | End: 2021-11-30

## 2021-09-03 ASSESSMENT — FIBROSIS 4 INDEX: FIB4 SCORE: 0.8

## 2021-09-03 NOTE — PROGRESS NOTES
OB follow up   + fetal movement. Active  No VB, LOF or UC's.  Phone # [phone]  Preferred pharmacy confirmed.  No complaints as of today

## 2021-09-03 NOTE — PROGRESS NOTES
S:  Pt is  at 24w0d for routine OB follow up.  Was admitted to hospital for tx of pyelonephritis, discharged on  with ABX. She is currently taking Bactrim but was unable to get Macrobid from pharmacy. We will send in another RX. Reports good FM.  Denies VB, LOF, RUCs or vaginal DC. Discussed abnormal Pap, with recommendation to repeat in one year    O: Please see above vitals    A:  IUP at 24w0d  Patient Active Problem List    Diagnosis Date Noted   • ASCUS with positive high risk HPV cervical 2021   • Encounter for supervision of other normal pregnancy, second trimester 2021   • Urinary tract infection in mother during second trimester of pregnancy 2021        P:  1.  Nutrition/diet discussed        2.  Questions answered.          3.  Encourage adequate water intake.        4.   labor precautions reviewed.         5.  F/u 2 wks.        6.  Rx for Macrobid        7.  Pt now taking prenatal vitamins, discussed increasing iron rich foods..

## 2021-11-05 ENCOUNTER — ROUTINE PRENATAL (OUTPATIENT)
Dept: OBGYN | Facility: CLINIC | Age: 21
End: 2021-11-05
Payer: COMMERCIAL

## 2021-11-05 VITALS — BODY MASS INDEX: 25.7 KG/M2 | DIASTOLIC BLOOD PRESSURE: 62 MMHG | WEIGHT: 140.5 LBS | SYSTOLIC BLOOD PRESSURE: 110 MMHG

## 2021-11-05 DIAGNOSIS — Z34.82 ENCOUNTER FOR SUPERVISION OF OTHER NORMAL PREGNANCY, SECOND TRIMESTER: Primary | ICD-10-CM

## 2021-11-05 PROCEDURE — 90040 PR PRENATAL FOLLOW UP: CPT | Performed by: NURSE PRACTITIONER

## 2021-11-05 PROCEDURE — 90715 TDAP VACCINE 7 YRS/> IM: CPT | Performed by: NURSE PRACTITIONER

## 2021-11-05 PROCEDURE — 90471 IMMUNIZATION ADMIN: CPT | Performed by: NURSE PRACTITIONER

## 2021-11-05 ASSESSMENT — FIBROSIS 4 INDEX: FIB4 SCORE: 0.8

## 2021-11-05 NOTE — PROGRESS NOTES
OB follow up   + fetal movement. Active  No VB, LOF or UC's.  Phone # [phone]  Preferred pharmacy confirmed.  Flu vaccine Declined   C/o  Vaginal sharp pain  BTL signed today   TDAP today   Kick count sheet given today.    TDAP  NDC: 46925-746-88   LOT#: [id number]  Expiration Date: 2023  Dose: 0.5mL  Site: Left Deltoid  Patient educated on use and side effects of medication. Name and  verified prior to injection. Pt tolerated? Well   Verified by MM  Administered by Peyton Keith, Med Ass't at 2:52 PM.  Patient Provided Medication: no

## 2021-11-05 NOTE — LETTER
"Count Your Baby's Movements  Another step to a healthy delivery    A Epic Dress Re Test             Dept: [phone]    How Many Weeks Pregnant? 33W0D    Date to Begin Countin2021              How to use this chart    One way for your physician to keep track of your baby's health is by knowing how often the baby moves (or \"kicks\") in your womb.  You can help your physician to do this by using this chart every day.    Every day, you should see how many hours it takes for your baby to move 10 times.  Start in the morning, as soon as you get up.    · First, write down the time your baby moves until you get to 10.  · Check off one box every time your baby moves until you get to 10.  · Write down the time you finished counting in the last column.  · Total how long it took to count up all 10 movements.  · Finally, fill in the box that shows how long this took.  After counting 10 movements, you no longer have to count any more that day.  The next morning, just start counting again as soon as you get up.    What should you call a \"movement\"?  It is hard to say, because it will feel different from one mother to another and from one pregnancy to the next.  The important thing is that you count the movements the same way throughout your pregnancy.  If you have more questions, you should ask your physician.    Count carefully every day!  SAMPLE:  Week 28    How many hours did it take to feel 10 movements?       Start  Time     1     2     3     4     5     6     7     8     9     10   Finish Time   Mon 8:20 ·  ·  ·  ·  ·  ·  ·  ·  ·  ·  11:40                  Sat               Sun                 IMPORTANT: You should contact your physician if it takes more than two hours for you to feel 10 movements.  Each morning, write down the time and start to count the movements of your baby.  Keep track by checking off one box every time you feel one movement.  When you " "have felt 10 \"kicks\", write down the time you finished counting in the last column.  Then fill in the   box (over the check alannah) for the number of hours it took.  Be sure to read the complete instructions on the previous page.            "

## 2021-11-05 NOTE — PROGRESS NOTES
S:  Reports some sharp vaginal pain.  Also notes her baby is very active.  Reports good FM.  Denies VB, LOF, RUCs or vaginal DC.     O:    Vitals:    21 1407   BP: 110/62   Weight: 63.7 kg (140 lb 8 oz)     See flow sheet    A:  IUP at 33w0d  Patient Active Problem List    Diagnosis Date Noted   • ASCUS with positive high risk HPV cervical 2021   • Encounter for supervision of other normal pregnancy, second trimester 2021   • Urinary tract infection in mother during second trimester of pregnancy 2021        P:  1.  PP contraception desires BTL, consent signed.        2.  Instructions given on FKCs.          3.  Questions answered.          4.  Encourage adequate water intake.        5.  1hr GTT, syphilis and CBC ordered.         6.   labor precautions reviewed.         7.  F/u 2 wks.        8.  TDap given.    I have placed the below orders and discussed them with an approved delegating provider. The MA is performing the below orders under the direction of  Dr. Cordero.

## 2021-11-19 ENCOUNTER — ROUTINE PRENATAL (OUTPATIENT)
Dept: OBGYN | Facility: CLINIC | Age: 21
End: 2021-11-19
Payer: COMMERCIAL

## 2021-11-19 VITALS — DIASTOLIC BLOOD PRESSURE: 78 MMHG | WEIGHT: 144.2 LBS | SYSTOLIC BLOOD PRESSURE: 118 MMHG | BODY MASS INDEX: 26.37 KG/M2

## 2021-11-19 DIAGNOSIS — Z34.83 ENCOUNTER FOR SUPERVISION OF OTHER NORMAL PREGNANCY IN THIRD TRIMESTER: ICD-10-CM

## 2021-11-19 PROCEDURE — 90040 PR PRENATAL FOLLOW UP: CPT | Performed by: NURSE PRACTITIONER

## 2021-11-19 ASSESSMENT — FIBROSIS 4 INDEX: FIB4 SCORE: 0.8

## 2021-11-19 NOTE — PROGRESS NOTES
SUBJECTIVE:  Pt is a 21 y.o.   at 35w0d  gestation. Presents today for follow-up prenatal care. Reports no issues at this time.  Reports good fetal movement. Denies regular cramping/contractions, bleeding or leaking of fluid. Denies dysuria, headaches, N/V. Generally feels well today except continued back pain, lightening crotch and baby moving a lot which hurts her belly.     OBJECTIVE:  - See prenatal vitals flow  -   Vitals:    21 1049   BP: 118/78   Weight: 65.4 kg (144 lb 3.2 oz)                 ASSESSMENT:   - IUP at 35w0d    - S=D   -   Patient Active Problem List    Diagnosis Date Noted   • ASCUS with positive high risk HPV cervical 2021   • Encounter for supervision of other normal pregnancy, third trimester 2021   • Urinary tract infection in mother during second trimester of pregnancy 2021         PLAN:  - S/sx pregnancy and labor warning signs vs general discomforts discussed  - Fetal movements and/or kick counts reviewed   - Adequate hydration reinforced  - Nutrition/exercise/vitamin education; continue PNV  - Remedies for back pain/ligament pains given   - Plans unsure for contraception Pp: handout given and reviewed  - S/p Tdap today, declines flu vacc  - Reviewed still needs to get third tri labs done but it is very late so refusal signed   - Reviewed COVID-19 vaccination recommendations: pt reports she wants to wait until baby is born   - Anticipatory guidance given  - RTC in 1 weeks for follow-up prenatal care

## 2021-11-19 NOTE — PROGRESS NOTES
OB follow up   + fetal movement. Active  No VB, LOF or UC's.  Phone # [phone]  Preferred pharmacy confirmed.  Flu vaccine Declined   No complaints as of today

## 2021-11-28 ENCOUNTER — HOSPITAL ENCOUNTER (INPATIENT)
Facility: MEDICAL CENTER | Age: 21
LOS: 2 days | End: 2021-11-30
Attending: OBSTETRICS & GYNECOLOGY | Admitting: OBSTETRICS & GYNECOLOGY
Payer: COMMERCIAL

## 2021-11-28 LAB
BASOPHILS # BLD AUTO: 0.5 % (ref 0–1.8)
BASOPHILS # BLD: 0.05 K/UL (ref 0–0.12)
CRYSTALS AMN MICRO: NORMAL
EOSINOPHIL # BLD AUTO: 0.04 K/UL (ref 0–0.51)
EOSINOPHIL NFR BLD: 0.4 % (ref 0–6.9)
ERYTHROCYTE [DISTWIDTH] IN BLOOD BY AUTOMATED COUNT: 39.4 FL (ref 35.9–50)
HCT VFR BLD AUTO: 34.4 % (ref 37–47)
HGB BLD-MCNC: 11.1 G/DL (ref 12–16)
HOLDING TUBE BB 8507: NORMAL
IMM GRANULOCYTES # BLD AUTO: 0.07 K/UL (ref 0–0.11)
IMM GRANULOCYTES NFR BLD AUTO: 0.8 % (ref 0–0.9)
LYMPHOCYTES # BLD AUTO: 1.88 K/UL (ref 1–4.8)
LYMPHOCYTES NFR BLD: 20.2 % (ref 22–41)
MCH RBC QN AUTO: 26.8 PG (ref 27–33)
MCHC RBC AUTO-ENTMCNC: 32.3 G/DL (ref 33.6–35)
MCV RBC AUTO: 83.1 FL (ref 81.4–97.8)
MONOCYTES # BLD AUTO: 0.58 K/UL (ref 0–0.85)
MONOCYTES NFR BLD AUTO: 6.2 % (ref 0–13.4)
NEUTROPHILS # BLD AUTO: 6.7 K/UL (ref 2–7.15)
NEUTROPHILS NFR BLD: 71.9 % (ref 44–72)
NRBC # BLD AUTO: 0 K/UL
NRBC BLD-RTO: 0 /100 WBC
PLATELET # BLD AUTO: 253 K/UL (ref 164–446)
PMV BLD AUTO: 11.8 FL (ref 9–12.9)
RBC # BLD AUTO: 4.14 M/UL (ref 4.2–5.4)
SARS-COV+SARS-COV-2 AG RESP QL IA.RAPID: NOTDETECTED
SPECIMEN SOURCE: NORMAL
WBC # BLD AUTO: 9.3 K/UL (ref 4.8–10.8)

## 2021-11-28 PROCEDURE — 304965 HCHG RECOVERY SERVICES

## 2021-11-28 PROCEDURE — 700111 HCHG RX REV CODE 636 W/ 250 OVERRIDE (IP): Performed by: OBSTETRICS & GYNECOLOGY

## 2021-11-28 PROCEDURE — A9270 NON-COVERED ITEM OR SERVICE: HCPCS | Performed by: PHYSICIAN ASSISTANT

## 2021-11-28 PROCEDURE — 59409 OBSTETRICAL CARE: CPT

## 2021-11-28 PROCEDURE — 770002 HCHG ROOM/CARE - OB PRIVATE (112)

## 2021-11-28 PROCEDURE — 59410 OBSTETRICAL CARE: CPT | Performed by: PHYSICIAN ASSISTANT

## 2021-11-28 PROCEDURE — 99283 EMERGENCY DEPT VISIT LOW MDM: CPT

## 2021-11-28 PROCEDURE — 700102 HCHG RX REV CODE 250 W/ 637 OVERRIDE(OP): Performed by: PHYSICIAN ASSISTANT

## 2021-11-28 PROCEDURE — 36415 COLL VENOUS BLD VENIPUNCTURE: CPT

## 2021-11-28 PROCEDURE — 700105 HCHG RX REV CODE 258: Performed by: OBSTETRICS & GYNECOLOGY

## 2021-11-28 PROCEDURE — 700105 HCHG RX REV CODE 258: Performed by: STUDENT IN AN ORGANIZED HEALTH CARE EDUCATION/TRAINING PROGRAM

## 2021-11-28 PROCEDURE — 87426 SARSCOV CORONAVIRUS AG IA: CPT

## 2021-11-28 PROCEDURE — 85025 COMPLETE CBC W/AUTO DIFF WBC: CPT

## 2021-11-28 PROCEDURE — 89060 EXAM SYNOVIAL FLUID CRYSTALS: CPT

## 2021-11-28 RX ORDER — DOCUSATE SODIUM 100 MG/1
100 CAPSULE, LIQUID FILLED ORAL 2 TIMES DAILY PRN
Status: DISCONTINUED | OUTPATIENT
Start: 2021-11-28 | End: 2021-11-30 | Stop reason: HOSPADM

## 2021-11-28 RX ORDER — ONDANSETRON 2 MG/ML
4 INJECTION INTRAMUSCULAR; INTRAVENOUS EVERY 4 HOURS PRN
Status: DISCONTINUED | OUTPATIENT
Start: 2021-11-28 | End: 2021-11-30 | Stop reason: HOSPADM

## 2021-11-28 RX ORDER — MISOPROSTOL 200 UG/1
600 TABLET ORAL
Status: DISCONTINUED | OUTPATIENT
Start: 2021-11-28 | End: 2021-11-30 | Stop reason: HOSPADM

## 2021-11-28 RX ORDER — SODIUM CHLORIDE, SODIUM LACTATE, POTASSIUM CHLORIDE, CALCIUM CHLORIDE 600; 310; 30; 20 MG/100ML; MG/100ML; MG/100ML; MG/100ML
INJECTION, SOLUTION INTRAVENOUS PRN
Status: DISCONTINUED | OUTPATIENT
Start: 2021-11-28 | End: 2021-11-30 | Stop reason: HOSPADM

## 2021-11-28 RX ORDER — IBUPROFEN 600 MG/1
600 TABLET ORAL EVERY 6 HOURS PRN
Status: DISCONTINUED | OUTPATIENT
Start: 2021-11-28 | End: 2021-11-30 | Stop reason: HOSPADM

## 2021-11-28 RX ORDER — SODIUM CHLORIDE, SODIUM LACTATE, POTASSIUM CHLORIDE, CALCIUM CHLORIDE 600; 310; 30; 20 MG/100ML; MG/100ML; MG/100ML; MG/100ML
INJECTION, SOLUTION INTRAVENOUS CONTINUOUS
Status: DISCONTINUED | OUTPATIENT
Start: 2021-11-28 | End: 2021-11-30 | Stop reason: HOSPADM

## 2021-11-28 RX ORDER — METHYLERGONOVINE MALEATE 0.2 MG/ML
0.2 INJECTION INTRAVENOUS
Status: DISCONTINUED | OUTPATIENT
Start: 2021-11-28 | End: 2021-11-30 | Stop reason: HOSPADM

## 2021-11-28 RX ADMIN — OXYTOCIN 2000 ML/HR: 10 INJECTION, SOLUTION INTRAMUSCULAR; INTRAVENOUS at 21:33

## 2021-11-28 RX ADMIN — OXYTOCIN 125 ML/HR: 10 INJECTION, SOLUTION INTRAMUSCULAR; INTRAVENOUS at 21:56

## 2021-11-28 RX ADMIN — FENTANYL CITRATE 100 MCG: 50 INJECTION, SOLUTION INTRAMUSCULAR; INTRAVENOUS at 19:02

## 2021-11-28 RX ADMIN — SODIUM CHLORIDE, POTASSIUM CHLORIDE, SODIUM LACTATE AND CALCIUM CHLORIDE: 600; 310; 30; 20 INJECTION, SOLUTION INTRAVENOUS at 14:00

## 2021-11-28 RX ADMIN — SODIUM CHLORIDE 5 MILLION UNITS: 900 INJECTION INTRAVENOUS at 14:40

## 2021-11-28 RX ADMIN — FENTANYL CITRATE 100 MCG: 50 INJECTION, SOLUTION INTRAMUSCULAR; INTRAVENOUS at 20:13

## 2021-11-28 RX ADMIN — OXYTOCIN 2 MILLI-UNITS/MIN: 10 INJECTION, SOLUTION INTRAMUSCULAR; INTRAVENOUS at 15:22

## 2021-11-28 RX ADMIN — ONDANSETRON 4 MG: 2 INJECTION INTRAMUSCULAR; INTRAVENOUS at 20:57

## 2021-11-28 RX ADMIN — IBUPROFEN 600 MG: 600 TABLET ORAL at 21:57

## 2021-11-28 RX ADMIN — SODIUM CHLORIDE 2.5 MILLION UNITS: 9 INJECTION, SOLUTION INTRAVENOUS at 18:36

## 2021-11-28 ASSESSMENT — LIFESTYLE VARIABLES
EVER_SMOKED: NEVER
ALCOHOL_USE: NO

## 2021-11-28 ASSESSMENT — PATIENT HEALTH QUESTIONNAIRE - PHQ9
SUM OF ALL RESPONSES TO PHQ9 QUESTIONS 1 AND 2: 0
2. FEELING DOWN, DEPRESSED, IRRITABLE, OR HOPELESS: NOT AT ALL
1. LITTLE INTEREST OR PLEASURE IN DOING THINGS: NOT AT ALL

## 2021-11-28 ASSESSMENT — FIBROSIS 4 INDEX: FIB4 SCORE: 0.8

## 2021-11-28 NOTE — ED PROVIDER NOTES
S: Pt is a 21 y.o.  at 36w2d with Estimated Date of Delivery: 21 who presents to triage c/o leakage of fluid from 9 AM this morning and RUC.  Negative VB.  Reports normal FM.      O: Pulse 75   Temp 36.4 °C (97.5 °F) (Temporal)   Resp 18          NST: Done and read by Dr. Chávez         Indication: LOF and RUC       FHR: 145       Variability: Moderate       Acclerations: Yes       Decelerations: No       Reactive: Yes         [place]: Regular UCs       SVE: 3/70/-2 per RN exam         A/P  Patient Active Problem List    Diagnosis Date Noted   • ASCUS with positive high risk HPV cervical 2021   • Encounter for supervision of other normal pregnancy, third trimester 2021   • Urinary tract infection in mother during second trimester of pregnancy 2021       The patient is a  at 36w2d with SAIDA 21 (by 8w US).  Pregnancy complicated by pyelonephritis.  She presents to OB ED triage with leakage of fluid from 9 AM this morning and RUC.    -Andree positive per OB ED RN  -Admit to L&D for active labor  -Monitor for labor  -Patient declines epidural for pain management  -Start penicillin now as the patient has an unknown GBS status at 36 weeks 2 days  -Pitocin for induction of labor  -AROM as indicated  -Clear liquid diet  -Ad lucas. activity

## 2021-11-28 NOTE — CARE PLAN
The patient is Stable - Low risk of patient condition declining or worsening         Progress made toward(s) clinical / shift goals:    Problem: Knowledge Deficit - L&D  Goal: Patient and family/caregivers will demonstrate understanding of plan of care, disease process/condition, diagnostic tests and medications  Outcome: Progressing     Problem: Risk for Excess Fluid Volume  Goal: Patient will demonstrate pulse, blood pressure and neurologic signs within expected ranges and without any respiratory complications  Outcome: Progressing     Problem: Psychosocial - L&D  Goal: Patient's level of anxiety will decrease  Outcome: Progressing  Goal: Patient will be able to discuss coping skills during hospitalization  Outcome: Progressing  Goal: Patient's ability to re-evaluate and adapt role responsibilities will improve  Outcome: Progressing  Goal: Spiritual and cultural needs incorporated into hospitalization  Outcome: Progressing     Problem: Risk for Venous Thromboembolism (VTE)  Goal: VTE prevention measures will be implemented and patient will remain free from VTE  Outcome: Progressing     Problem: Risk for Infection and Impaired Wound Healing  Goal: Patient will remain free from infection  Outcome: Progressing  Note: Pt is afebrile and free from s/s of infection at this time. Will continue to assess.   Goal: Patient's wound/surgical incision will decrease in size and heals properly  Outcome: Progressing     Problem: Risk for Fluid Imbalance  Goal: Patient's fluid volume balance will be maintained or improve  Outcome: Progressing     Problem: Risk for Injury  Goal: Patient and fetus will be free of preventable injury/complications  Outcome: Progressing     Problem: Pain  Goal: Patient's pain will be alleviated or reduced to the patient’s comfort goal  Outcome: Progressing  Note: Pt reports feeling comfortable and free form pain at this time. Pain management options for labor discussed, pt would like IV analgesia  during labor. Will continue to assess.     Problem: Discharge Barriers/Planning  Goal: Patient's continuum of care needs are met  Outcome: Progressing       Patient is not progressing towards the following goals:

## 2021-11-28 NOTE — PROGRESS NOTES
"1250 Report received from Melody PASTOR RN at bedside.    1300 Sterile speculum exam by this RN, small amount of clear fluid noted. Specimen collected for fern test. SVE by RN 3/70/-2    1315 Dr. Chen given report. Proivder at bedside, POC discussed with pt and s/o.    1325 Dr. Chen given update, admission order received.    1330 Pt transferred to labor room ambulatory    1828 Pt now breathing through UCs and reporting significant pain with intermittent vaginal pressure, s/o providing support.     SVE by RN -2010 Pt reports increased pressure and pain. SVE by RN . Martha MONK given update.     Pt reports feeling urge to push. SVE by RN /. RN at bedside through delivery.     Pt states \"I need to push,\" Martha MONK called to attend delivery     Tamsevelyn at bedside through delivery     SVE by provider C/+2, pt started pushing      of viable male infant apgars 8/9     Delivery of placenta S/I     225 Pt assisted up to the bathroom and voided.    231 Pt transferred to  via wheelchair with infant in arms. Pt and infant stable.     Report given to Gillian DAVIES RN at bedside. Bands verified and cuddles active.  "

## 2021-11-28 NOTE — PROGRESS NOTES
"1230- pt to triage c/o \"gush\" of fluid 0930 today and has been leaking fluid since. Pt denies bleeding and c/o intermittent uc. +fm and audible fht 140. Will do fern and sve after being monitored for 10 mins    1250- report to yane for lunch coverage    1335- yane took over primary rn for admission  "

## 2021-11-28 NOTE — H&P
OB H&P:    CC: LOF and RUC    HPI:  Ms. Jan Montes De Oca is a 21 y.o.  @ 36w2d with SAIDA 21 (by 8w US).  Pregnancy complicated by pyelonephritis.  She presents to OB ED triage with leakage of fluid from 9 AM this morning and RUC.    Contractions: Yes   Loss of fluid: Yes   Vaginal bleeding: No   Fetal movement: present    Patient reported that she was hospitalized and subsequently treated with IV antibiotics for pyelonephritis (discharge ).  She said she also had taken oral antibiotics for pyelomnephritis.  She stated that she did not take any of the UTI prevention medication as they were too expensive.    PNC with [facility]    PNL:  Recent Labs     21  0651 21  2100   ABOGROUP O  --    RUBELLAIGG 1.31  --    RPR Non Reactive  --    URINECULT  --  Final report*   HEPBSAG Negative  --    HEPCAB 0.2  --      Rh+, RI (intermediate), HIV neg, TrepAb neg, HBsAg NR, GC/CT neg/neg  GBS Unknown    ROS: Negative unless stated in HPI    OB History    Para Term  AB Living   4 2 2 0 1 2   SAB IAB Ectopic Molar Multiple Live Births   1 0 0 0 0 2      # Outcome Date GA Lbr Akbar/2nd Weight Sex Delivery Anes PTL Lv   4 Current            3 Term 18 40w0d  3.175 kg (7 lb) F Vag-Spont None N MAGGIE   2 Term 13 40w0d  3.175 kg (7 lb) M Vag-Spont None N MAGGIE   1 SAB                GYN: denies STIs, no cervical procedures      Past Surgical History:   Procedure Laterality Date   • CHOLECYSTECTOMY         No current facility-administered medications on file prior to encounter.     No current outpatient medications on file prior to encounter.       Family History   Problem Relation Age of Onset   • Cancer Neg Hx    • Heart Disease Neg Hx    • Diabetes Neg Hx        Social History     Tobacco Use   • Smoking status: Never Smoker   • Smokeless tobacco: Never Used   Vaping Use   • Vaping Use: Never used   Substance Use Topics   • Alcohol use: Not Currently   • Drug use: Never  "        PE:  Vitals:    21 1200 21 1227   BP:  121/70   Pulse: 75 89   Resp: 18    Temp: 36.4 °C (97.5 °F)    TempSrc: Temporal    Weight:  65.3 kg (144 lb)   Height:  1.575 m (5' 2\")     gen: AAO, NAD  abd: soft, gravid, NT, Estimated Fetal Weight:  492 grams  EFW Percentile: 72.8% (by 22w US)   Ext: NT, No edema    NST: Done and read by Dr. Chávez          Indication: LOF and RUC       FHR: 145       Variability: Moderate       Acclerations: Yes       Decelerations: No       Reactive: Yes          [place]: Regular UCs       SVE: 3/70/-2 per RN exam    The patient is a  at 36w2d with SAIDA 21 (by 8w US).  Pregnancy complicated by pyelonephritis.  She presents to OB ED triage with leakage of fluid from 9 AM this morning and RUC.     -Ferning positive per OB ED RN  -Admit to L&D for active labor  -Monitor for labor  -Patient declines epidural for pain management  -Start penicillin now as the patient has an unknown GBS status at 36 weeks 2 days  -Pitocin for induction of labor  -AROM as indicated  -Clear liquid diet  -Ad lucas. activity          Marcia Chen M.D.      "

## 2021-11-29 LAB
ERYTHROCYTE [DISTWIDTH] IN BLOOD BY AUTOMATED COUNT: 39 FL (ref 35.9–50)
HCT VFR BLD AUTO: 32.8 % (ref 37–47)
HGB BLD-MCNC: 10.6 G/DL (ref 12–16)
MCH RBC QN AUTO: 26.6 PG (ref 27–33)
MCHC RBC AUTO-ENTMCNC: 32.3 G/DL (ref 33.6–35)
MCV RBC AUTO: 82.2 FL (ref 81.4–97.8)
PLATELET # BLD AUTO: 225 K/UL (ref 164–446)
PMV BLD AUTO: 11.8 FL (ref 9–12.9)
RBC # BLD AUTO: 3.99 M/UL (ref 4.2–5.4)
WBC # BLD AUTO: 12.6 K/UL (ref 4.8–10.8)

## 2021-11-29 PROCEDURE — 85027 COMPLETE CBC AUTOMATED: CPT

## 2021-11-29 PROCEDURE — 770002 HCHG ROOM/CARE - OB PRIVATE (112)

## 2021-11-29 PROCEDURE — 36415 COLL VENOUS BLD VENIPUNCTURE: CPT

## 2021-11-29 ASSESSMENT — EDINBURGH POSTNATAL DEPRESSION SCALE (EPDS)
I HAVE BEEN ANXIOUS OR WORRIED FOR NO GOOD REASON: YES, SOMETIMES
I HAVE BEEN ABLE TO LAUGH AND SEE THE FUNNY SIDE OF THINGS: AS MUCH AS I ALWAYS COULD
I HAVE LOOKED FORWARD WITH ENJOYMENT TO THINGS: AS MUCH AS I EVER DID
I HAVE FELT SAD OR MISERABLE: NO, NOT AT ALL
I HAVE BEEN SO UNHAPPY THAT I HAVE BEEN CRYING: NO, NEVER
I HAVE FELT SCARED OR PANICKY FOR NO GOOD REASON: NO, NOT AT ALL
THINGS HAVE BEEN GETTING ON TOP OF ME: NO, MOST OF THE TIME I HAVE COPED QUITE WELL
I HAVE BLAMED MYSELF UNNECESSARILY WHEN THINGS WENT WRONG: YES, SOME OF THE TIME
THE THOUGHT OF HARMING MYSELF HAS OCCURRED TO ME: NEVER
I HAVE BEEN SO UNHAPPY THAT I HAVE HAD DIFFICULTY SLEEPING: NOT AT ALL

## 2021-11-29 NOTE — PROGRESS NOTES
Post Partum Progress Note    Name:   Jan Montes De Oca   Date/Time:  11/29/2021 - 7:07 AM  Chief Admitting Dx:  Vaginal delivery [O80]  Delivery Type:  vaginal, spontaneous  Post-Op/Post Partum Days #:  1    Subjective:  Abdominal pain: no  Ambulating:   yes  Tolerating liquids:  yes  Tolerating food:  yes common adult  Flatus:   yes  BM:    no  Bleeding:   with a small amount of bleeding  Voiding:   yes  Dizziness:   no  Feeding:   breast    Vitals:    11/28/21 2253 11/28/21 2345 11/29/21 0200 11/29/21 0600   BP: 129/80 119/79 118/77 (!) 95/62   Pulse: 97 (!) 105 88 69   Resp:  18 18 18   Temp:  36.9 °C (98.5 °F) 36.2 °C (97.1 °F) 36.2 °C (97.2 °F)   TempSrc:  Temporal Temporal Temporal   Weight:       Height:           Exam:  Breast: Tenderness no and Engorged no  Abdomen: Abdomen soft, non-tender. BS normal. No masses,  No organomegaly  Fundal Tenderness:  no  Fundus Firm: yes  Incision: none  Below umbilicus: yes  Perineum: perineum intact  Lochia: mild  Extremities: trace extremities, peripheral pulses and reflexes normal    Meds:  Current Facility-Administered Medications   Medication Dose   • oxytocin (PITOCIN) infusion (for induction)  0.5-20 lashay-units/min   • lactated ringers infusion     • fentaNYL (SUBLIMAZE) injection 50 mcg  50 mcg    Or   • fentaNYL (SUBLIMAZE) injection 100 mcg  100 mcg   • oxytocin (PITOCIN) 20 UNITS/1000ML LR (postpartum)   mL/hr   • ondansetron (ZOFRAN) syringe/vial injection 4 mg  4 mg   • ibuprofen (MOTRIN) tablet 600 mg  600 mg   • LR infusion     • measles, mumps and rubella vaccine (MMR) injection 0.5 mL  0.5 mL   • PRN oxytocin (PITOCIN) (20 Units/1000 mL) PRN for excessive uterine bleeding - See Admin Instr  125-999 mL/hr   • miSOPROStol (CYTOTEC) tablet 600 mcg  600 mcg   • methylergonovine (METHERGINE) injection 0.2 mg  0.2 mg   • docusate sodium (COLACE) capsule 100 mg  100 mg       Labs:   Recent Labs     11/28/21  1400   WBC 9.3   RBC 4.14*   HEMOGLOBIN 11.1*    HEMATOCRIT 34.4*   MCV 83.1   MCH 26.8*   MCHC 32.3*   RDW 39.4   PLATELETCT 253   MPV 11.8       Assessment:  Chief Admitting Dx:  Vaginal delivery [O80]  Delivery Type:  vaginal, spontaneous  Tubal Ligation:  no    Plan:  Continue routine post partum care. Advance care, encourage ambulation, pain control, pt desires d/c, but will need to be after 2200 tonight, GBS unknown - pt to talk with peds, consider d/c later today or tomorrow, PPD#2.     NANCY Bess.

## 2021-11-29 NOTE — LACTATION NOTE
This note was copied from a baby's chart.  21yr, , LPI    Baby bundle wrapped and asleep in open crib.  Discussed feeding plan with baby and breastfeeding history with MOB.  MOB desires to breastfeed this baby and was successful breastfeeding other two children.  Discussed LPI and importance of making sure baby breastfeeds frequently and at least every 2-3 hours. Hand expression taught to mom and large amounts of colostrum expressed.  Instructed mom to call RN or LC if unable to get baby to breastfeed and not to go longer than 4 hours between feedings.  May spoon feed colostrum if baby is too sleepy or if unable to get latched..    Breastfeeding plan:    Feed baby with feeding cues and at least a minimum of 8x/24 hours.  Expect cluster feeding as this is normal during early days of life and growth spurts.  It is not recommended to let baby sleep longer than 4 hours between feedings and if sleepy, place skin to skin to promote feeding interest and milk production.  Baby's usually feed more frequently and longer when skin to skin with mother. It is not recommended to use pacifiers until breast feeding and milk production is well established or at least 4 weeks.    Make sure infant is getting enough by ensuring frequent feedings as well as looking for transitioning stools from dark meconium to bright yellow/green seedy loose stools by day 5.     Follow up with PEDS PCP as scheduled for weight checks and to make sure feeding is progressing appropriately.    Enrolled today in WIC program with [facility] information sheet provided  [facility] Breastfeeding resources information provided  Supplemental guidelines information sheet provided  Paced Bottle feeding information provided  ZO[facility] breastfeeding support meeting information sheet provided

## 2021-11-29 NOTE — PROGRESS NOTES
2320 Pt arrived to S330 via wheelchair with L&D RN. Report received from Iqra PAUL. Assessment completed. Denies pain at this time. Pt oriented to room, call light, infant security, emergency call light, and unit routine. Encouraged to call for assistance.

## 2021-11-29 NOTE — PROGRESS NOTES
Assessment WNL. Fundus firm, lochia light. Voiding independently without difficulty. Self ambulatory in room. Pain controlled with prn medications per mar, pt will call for pain medications as needed. Breastfeeding, reports going well, no nipple pain per pt. POC discussed, pt expresses understanding. Encouraged to call for assistance.

## 2021-11-29 NOTE — L&D DELIVERY NOTE
DATE OF SERVICE:  2021     On 2021 at 2125, this 21-year-old  4, para 2  female with an   intrauterine pregnancy at 36 weeks and 2/7th days under no anesthesia,   delivered a viable male infant with Apgar scores of 8 and 9, weight is   currently pending as baby is still skin to skin.  The patient was admitted in   active labor, had a spontaneous rupture of membranes with clear fluid at 9:30   this morning and progressed to complete.  GBS is unknown.  The patient   received 2 doses of penicillin in labor and also 2 doses of fentanyl.    Delivery was via normal spontaneous vaginal delivery to a sterile field in an   occiput anterior position.  No nuchal cord was noted.  Delivery was assisted   by PA student, Dee Dee Jean-Baptiste, and performed by myself.  Infant was placed on   maternal abdomen with the waiting RN.  Respiratory therapy was also called due   to  labor status.  Delayed cord clamping occurred until the cord   stopped pulsing.  The cord was clamped by the LACOH connolly and cut by the father   of the baby.  An intact placenta with a 3-vessel cord delivered spontaneously   at 2133.  Pitocin was then started wide open in the IV.  The patient had   excellent hemostasis and fundus was found to be firm.  Vagina was explored.    No lacerations were noted.  Estimated blood loss 250 mL.  Dr. Chávez is the   attending.        ______________________________  LACHO Bess        ____________________________Zoltan Chávez MD    DUANE/SUB    DD:  2021 21:52  DT:  2021 22:20    Job#:  [id number]

## 2021-11-29 NOTE — CARE PLAN
The patient is Stable - Low risk of patient condition declining or worsening    Shift Goals  Clinical Goals: pain control    Progress made toward(s) clinical / shift goals:  Pt reports no pain at this time, will call for pain medications.    Patient is not progressing towards the following goals:

## 2021-11-29 NOTE — CARE PLAN
The patient is Stable - Low risk of patient condition declining or worsening    Shift Goals  Clinical Goals: maintain stable vital signs, rest    Problem: Psychosocial - Postpartum  Goal: Patient will verbalize and demonstrate effective bonding and parenting behavior  Outcome: Progressing     Problem: Altered Physiologic Condition  Goal: Patient physiologically stable as evidenced by normal lochia, palpable uterine involution and vitals within normal limits  Outcome: Progressing  Note: VSS. Fundus firm and lochia light.

## 2021-11-30 ENCOUNTER — PHARMACY VISIT (OUTPATIENT)
Dept: PHARMACY | Facility: MEDICAL CENTER | Age: 21
End: 2021-11-30
Payer: MEDICARE

## 2021-11-30 VITALS
SYSTOLIC BLOOD PRESSURE: 108 MMHG | HEART RATE: 71 BPM | BODY MASS INDEX: 26.5 KG/M2 | DIASTOLIC BLOOD PRESSURE: 62 MMHG | WEIGHT: 144 LBS | TEMPERATURE: 97.5 F | RESPIRATION RATE: 18 BRPM | HEIGHT: 62 IN

## 2021-11-30 PROCEDURE — RXMED WILLOW AMBULATORY MEDICATION CHARGE: Performed by: NURSE PRACTITIONER

## 2021-11-30 RX ORDER — IBUPROFEN 600 MG/1
600 TABLET ORAL EVERY 6 HOURS PRN
Qty: 30 TABLET | Refills: 0 | Status: SHIPPED | OUTPATIENT
Start: 2021-11-30

## 2021-11-30 RX ORDER — PSEUDOEPHEDRINE HCL 30 MG
100 TABLET ORAL 2 TIMES DAILY PRN
Qty: 60 CAPSULE | Refills: 0 | Status: SHIPPED | OUTPATIENT
Start: 2021-11-30

## 2021-11-30 NOTE — CARE PLAN
The patient is Stable - Low risk of patient condition declining or worsening    Shift Goals  Clinical Goals: To keep pain under control.  Patient Goals: To be able to go home today.    Progress made toward(s) clinical / shift goals: Patient has a good pain control.     Reviewed discharge instruction with the patient. She received her prescription. Emphasized the importance of  screening follow-up test. Questions and concerns have been answered. Baby's ID band matches with FOB.

## 2021-11-30 NOTE — DISCHARGE SUMMARY
Discharge Summary:      Jan Montes De Oca    Admit Date:   2021  Discharge Date:  2021     Admitting diagnosis:  Vaginal delivery [O80]  Discharge Diagnosis: Status post vaginal, spontaneous.  Pregnancy Complications: PTD  Tubal Ligation:  no        History:  History reviewed. No pertinent past medical history.  OB History    Para Term  AB Living   4 3 2 1 1 3   SAB IAB Ectopic Molar Multiple Live Births   1 0 0 0 0 3      # Outcome Date GA Lbr Akbar/2nd Weight Sex Delivery Anes PTL Lv   4  21 36w2d 11:50 / 00:05 3.11 kg (6 lb 13.7 oz) M Vag-Spont None Y MAGGIE   3 Term 18 40w0d  3.175 kg (7 lb) F Vag-Spont None N MAGGIE   2 Term 13 40w0d  3.175 kg (7 lb) M Vag-Spont None N MAGGIE   1 SAB                 Patient has no known allergies.  Patient Active Problem List    Diagnosis Date Noted   • ASCUS with positive high risk HPV cervical 2021   • Encounter for supervision of other normal pregnancy, third trimester 2021   • Urinary tract infection in mother during second trimester of pregnancy 2021        Hospital Course:   21 y.o. , now para 3, was admitted with the above mentioned diagnosis, underwent Active Labor, vaginal, spontaneous. Patient postpartum course was unremarkable, with progressive advancement in diet , ambulation and toleration of oral analgesia. Patient without complaints today and desires discharge.      Vitals:    21 0600 21 1000 21 1400 21 1800   BP: (!) 95/62 125/85 104/66 114/76   Pulse: 69 67 74 84   Resp: 18 16 16 18   Temp: 36.2 °C (97.2 °F) 37.1 °C (98.7 °F) 36.8 °C (98.2 °F) 37 °C (98.6 °F)   TempSrc: Temporal Temporal Temporal Temporal   Weight:       Height:           Current Facility-Administered Medications   Medication Dose   • oxytocin (PITOCIN) infusion (for induction)  0.5-20 lashay-units/min   • lactated ringers infusion     • fentaNYL (SUBLIMAZE) injection 50 mcg  50 mcg    Or   • fentaNYL  (SUBLIMAZE) injection 100 mcg  100 mcg   • oxytocin (PITOCIN) 20 UNITS/1000ML LR (postpartum)   mL/hr   • ondansetron (ZOFRAN) syringe/vial injection 4 mg  4 mg   • ibuprofen (MOTRIN) tablet 600 mg  600 mg   • LR infusion     • measles, mumps and rubella vaccine (MMR) injection 0.5 mL  0.5 mL   • PRN oxytocin (PITOCIN) (20 Units/1000 mL) PRN for excessive uterine bleeding - See Admin Instr  125-999 mL/hr   • miSOPROStol (CYTOTEC) tablet 600 mcg  600 mcg   • methylergonovine (METHERGINE) injection 0.2 mg  0.2 mg   • docusate sodium (COLACE) capsule 100 mg  100 mg       Exam:  Breast Exam: negative  Abdomen: Abdomen soft, non-tender. BS normal. No masses,  No organomegaly  Fundus Non Tender: yes  Incision: none  Perineum: perineum intact  Extremity: extremities, peripheral pulses and reflexes normal     Labs:  Recent Labs     11/28/21  1400 11/29/21  0615   WBC 9.3 12.6*   RBC 4.14* 3.99*   HEMOGLOBIN 11.1* 10.6*   HEMATOCRIT 34.4* 32.8*   MCV 83.1 82.2   MCH 26.8* 26.6*   MCHC 32.3* 32.3*   RDW 39.4 39.0   PLATELETCT 253 225   MPV 11.8 11.8        Activity:   Discharge to home  Pelvic Rest x 6 weeks    Assessment:  normal postpartum course  Discharge Assessment: Taking in adequate diet and fluids, no heavy bleeding or foul smelling discharge, no redness or severe pain of breasts, voiding without difficulty     Follow up: .[facility] in 5 weeks for vaginal ; 1 week for incision check.     Pt need to call or go to ED for any of the following:  Fever over 100.5  Severe abd pain  Severe pain or swelling of laceration or incinsion site  Red streaks or painful masses in the breasts  Foul smelling d/c or lochia  Heavy vaginal bleeding saturating a pad per hour  Sxs of PP depression     Discharge Meds:   Current Outpatient Medications   Medication Sig Dispense Refill   • docusate sodium 100 MG Cap Take 100 mg by mouth 2 times a day as needed for Constipation. 60 Capsule 0   • ibuprofen (MOTRIN) 600 MG Tab  Take 1 Tablet by mouth every 6 hours as needed. 30 Tablet 0       JOSÉ Sagastume.

## 2021-11-30 NOTE — DISCHARGE PLANNING
Meds-to-Beds: Discharge prescription orders listed below delivered to patient's bedside. HUMBERTO Baron notified. Patient counseled.      Current Outpatient Medications   Medication Sig Dispense Refill   • docusate sodium 100 MG Cap Take 1 capsule by mouth 2 times a day as needed for Constipation. 60 Capsule 0   • ibuprofen (MOTRIN) 600 MG Tab Take 1 Tablet by mouth every 6 hours as needed. 30 Tablet 0      Kaitlynn Oneill, PharmD

## 2021-11-30 NOTE — DISCHARGE INSTRUCTIONS
PATIENT DISCHARGE EDUCATION INSTRUCTION SHEET    REASONS TO CALL YOUR OBSTETRICIAN  · Persistent fever, shaking, chills (Temperature higher than 100.4) may indicate you have an infection  · Heavy bleeding: soaking more than 1 pad per hour; Passing clots an egg-sized clot or bigger may mean you have an postpartum hemorrhage  · Foul odor from vagina or bad smelling or discolored discharge or blood  · Breast infection (Mastitis symptoms); breast pain, chills, fever, redness or red streaks, may feel flu like symptoms  · Urinary pain, burning or frequency  · Incision that is not healing, increased redness, swelling, tenderness or pain, or any pus from episiotomy or  site may mean you have an infection  · Redness, swelling, warmth, or painful to touch in the calf area of your leg may mean you have a blood clot  · Severe or intensified depression, thoughts or feelings of wanting to hurt yourself or someone else   · Pain in chest, obstructed breathing or shortness of breath (trouble catching your breath) may mean you are having a postpartum complication. Call your provider immediately   · Headache that does not get better, even after taking medicine, a bad headache with vision changes or pain in the upper right area of your belly may mean you have high blood pressure or post birth preeclampsia. Call your provider immediately    HAND WASHING  All family and friends should wash their hands:  · Before and after holding the baby  · Before feeding the baby  · After using the restroom or changing the baby's diaper    WOUND CARE  Ask your physician for additional care instructions. In general:  · Episiotomy/Laceration  · May use aaron-spray bottle, witch hazel pads and dermaplast spray for comfort  · Use aaron-spray bottle after urinating to cleanse perineal area  · To prevent burning during urination spray aaron-water bottle on labial area   · Pat perineal area dry until episiotomy/laceration is healed  · Continue to use  aaron-bottle until bleeding stops as needed  · If have a 2nd degree laceration or greater, a Sitz bath can offer relief from soreness, burning, and inflammation   · Sitz Bath   · Sit in 6 inches of warm water and soak laceration as needed until the laceration heals    VAGINAL CARE AND BLEEDING  · Nothing inside vagina for 6 weeks:   · No sexual intercourse, tampons or douching  · Bleeding may continue for 2-4 weeks. Amount and color may vary  · Soaking 1 pad or more in an hour for several hours is considered heavy bleeding  · Passing large egg sized blood clots can be concerning  · If you feel like you have heavy bleeding or are having increasing amount of blood clots call your Obstetrician immediately  · If you begin feeling faint upon standing, feeling sick to your stomach, have clammy skin, a really fast heartbeat, have chills, start feeling confused, dizzy, sleepy or weak, or feeling like you're going to faint call your Obstetrician immediately    HYPERTENSION   Preeclampsia or gestational hypertension are types of high blood pressure that only pregnant women can get. It is important for you to be aware of symptoms to seek early intervention and treatment. If you have any of these symptoms immediately call your Obstetrician    · Vision changes or blurred vision   · Severe headache or pain that is unrelieved with medication and will not go away  · Persistent pain in upper abdomen or shoulder   · Increased swelling of face, feet, or hands  · Difficulty breathing or shortness of breath at rest  · Urinating less than usual    URINATION AND BOWEL MOVEMENTS  · Eating more fiber (bran cereal, fruits, and vegetables) and drinking plenty of fluids will help to avoid constipation  · Urinary frequency and urgency after childbirth is normal  · If you experience any urinary pain, burning or frequency call your provider    BIRTH CONTROL  · It is possible to become pregnant at any time after delivery and while  "breastfeeding  · Plan to discuss a method of birth control with your physician at your post delivery follow up visit    POSTPARTUM BLUES  During the first few days after birth, you may experience a sense of the \"blues\" which may include impatience, irritability or even crying. These feelings come and go quickly. However, as many as 1 in 10 women experience emotional symptoms known as postpartum depression.     POSTPARTUM DEPRESSION    May start as early as the second or third day after delivery or take several weeks or months to develop. Symptoms of \"blues\" are present, but are more intense: Crying spells; loss of appetite; feelings of hopelessness or loss of control; fear of touching the baby; over concern or no concern at all about the baby; little or no concern about your own appearance/caring for yourself; and/or inability to sleep or excessive sleeping. Contact your Obstetrician if you are experiencing any of these symptoms     PREVENTING SHAKEN BABY  If you are angry or stressed, PUT THE BABY IN THE CRIB, step away, take some deep breaths, and wait until you are calm to care for the baby. DO NOT SHAKE THE BABY. You are not alone, call a supporter for help.  · [facility] 24/7 crisis call line (310-009-6646) or (1-387.773.1721)  · You can also text them, text \"ANSWER\" (127624)      "

## 2021-11-30 NOTE — PROGRESS NOTES
12-hour chart check done. MAR and notes reviewed.    @ 0700: Report received from Zaria PAUL. Assumed care.    @ 0750: Patient is awake and alert. Breast feeding her infant. Gave a score of 9 for latch.    @ 0820: Discussed plan of care. Assessment done. She denies pain and no difficulty voiding.